# Patient Record
Sex: FEMALE | Race: WHITE | NOT HISPANIC OR LATINO | Employment: OTHER | URBAN - METROPOLITAN AREA
[De-identification: names, ages, dates, MRNs, and addresses within clinical notes are randomized per-mention and may not be internally consistent; named-entity substitution may affect disease eponyms.]

---

## 2017-03-10 ENCOUNTER — GENERIC CONVERSION - ENCOUNTER (OUTPATIENT)
Dept: OTHER | Facility: OTHER | Age: 82
End: 2017-03-10

## 2017-03-17 DIAGNOSIS — N18.30 CHRONIC KIDNEY DISEASE, STAGE III (MODERATE) (HCC): ICD-10-CM

## 2017-03-17 DIAGNOSIS — I12.9 HYPERTENSIVE CHRONIC KIDNEY DISEASE WITH STAGE 1 THROUGH STAGE 4 CHRONIC KIDNEY DISEASE, OR UNSPECIFIED CHRONIC KIDNEY DISEASE: ICD-10-CM

## 2017-03-17 DIAGNOSIS — D63.1 ANEMIA IN CHRONIC KIDNEY DISEASE (CODE): ICD-10-CM

## 2017-04-07 ENCOUNTER — ALLSCRIPTS OFFICE VISIT (OUTPATIENT)
Dept: OTHER | Facility: OTHER | Age: 82
End: 2017-04-07

## 2017-05-24 ENCOUNTER — ALLSCRIPTS OFFICE VISIT (OUTPATIENT)
Dept: OTHER | Facility: OTHER | Age: 82
End: 2017-05-24

## 2017-05-26 DIAGNOSIS — D69.6 THROMBOCYTOPENIA (HCC): ICD-10-CM

## 2017-05-26 DIAGNOSIS — I12.9 HYPERTENSIVE CHRONIC KIDNEY DISEASE WITH STAGE 1 THROUGH STAGE 4 CHRONIC KIDNEY DISEASE, OR UNSPECIFIED CHRONIC KIDNEY DISEASE: ICD-10-CM

## 2017-05-30 ENCOUNTER — GENERIC CONVERSION - ENCOUNTER (OUTPATIENT)
Dept: OTHER | Facility: OTHER | Age: 82
End: 2017-05-30

## 2017-08-01 DIAGNOSIS — E87.70 FLUID OVERLOAD: ICD-10-CM

## 2017-08-01 DIAGNOSIS — E78.00 PURE HYPERCHOLESTEROLEMIA: ICD-10-CM

## 2017-08-01 DIAGNOSIS — N18.30 CHRONIC KIDNEY DISEASE, STAGE III (MODERATE) (HCC): ICD-10-CM

## 2017-08-01 DIAGNOSIS — Z00.00 ENCOUNTER FOR GENERAL ADULT MEDICAL EXAMINATION WITHOUT ABNORMAL FINDINGS: ICD-10-CM

## 2017-08-01 DIAGNOSIS — D63.1 ANEMIA IN CHRONIC KIDNEY DISEASE (CODE): ICD-10-CM

## 2017-08-01 DIAGNOSIS — I12.9 HYPERTENSIVE CHRONIC KIDNEY DISEASE WITH STAGE 1 THROUGH STAGE 4 CHRONIC KIDNEY DISEASE, OR UNSPECIFIED CHRONIC KIDNEY DISEASE: ICD-10-CM

## 2017-08-01 DIAGNOSIS — I10 ESSENTIAL (PRIMARY) HYPERTENSION: ICD-10-CM

## 2017-08-01 DIAGNOSIS — N17.9 ACUTE KIDNEY FAILURE (HCC): ICD-10-CM

## 2017-09-05 ENCOUNTER — GENERIC CONVERSION - ENCOUNTER (OUTPATIENT)
Dept: OTHER | Facility: OTHER | Age: 82
End: 2017-09-05

## 2017-09-05 ENCOUNTER — GENERIC CONVERSION - ENCOUNTER (OUTPATIENT)
Dept: NEPHROLOGY | Facility: CLINIC | Age: 82
End: 2017-09-05

## 2017-09-05 DIAGNOSIS — E78.00 PURE HYPERCHOLESTEROLEMIA: ICD-10-CM

## 2017-09-05 DIAGNOSIS — D63.1 ANEMIA IN CHRONIC KIDNEY DISEASE (CODE): ICD-10-CM

## 2017-09-05 DIAGNOSIS — N18.30 CHRONIC KIDNEY DISEASE, STAGE III (MODERATE) (HCC): ICD-10-CM

## 2017-09-05 DIAGNOSIS — I12.9 HYPERTENSIVE CHRONIC KIDNEY DISEASE WITH STAGE 1 THROUGH STAGE 4 CHRONIC KIDNEY DISEASE, OR UNSPECIFIED CHRONIC KIDNEY DISEASE: ICD-10-CM

## 2017-09-05 DIAGNOSIS — E83.52 HYPERCALCEMIA: ICD-10-CM

## 2017-09-05 DIAGNOSIS — E87.70 FLUID OVERLOAD: ICD-10-CM

## 2017-12-01 DIAGNOSIS — N18.30 CHRONIC KIDNEY DISEASE, STAGE III (MODERATE) (HCC): ICD-10-CM

## 2017-12-01 DIAGNOSIS — E83.52 HYPERCALCEMIA: ICD-10-CM

## 2017-12-01 DIAGNOSIS — E78.00 PURE HYPERCHOLESTEROLEMIA: ICD-10-CM

## 2017-12-01 DIAGNOSIS — E87.70 FLUID OVERLOAD: ICD-10-CM

## 2017-12-01 DIAGNOSIS — D63.1 ANEMIA IN CHRONIC KIDNEY DISEASE (CODE): ICD-10-CM

## 2017-12-01 DIAGNOSIS — I12.9 HYPERTENSIVE CHRONIC KIDNEY DISEASE WITH STAGE 1 THROUGH STAGE 4 CHRONIC KIDNEY DISEASE, OR UNSPECIFIED CHRONIC KIDNEY DISEASE: ICD-10-CM

## 2017-12-08 ENCOUNTER — GENERIC CONVERSION - ENCOUNTER (OUTPATIENT)
Dept: OTHER | Facility: OTHER | Age: 82
End: 2017-12-08

## 2018-01-11 NOTE — MISCELLANEOUS
Message   Recorded as Task   Date: 12/22/2016 08:39 AM, Created By: Marko Andrade   Task Name: Follow Up   Assigned To: Maykel Ricks   Regarding Patient: William Sinclair, Status: Active   Comment:    Marko Andrade - 22 Dec 2016 8:39 AM     TASK CREATED  IRON ONE A DAY AS TOLERATED RE CONSTIPATION   i spoke to pt and informed about the above  PL      Active Problems    1  Acute kidney injury (584 9) (N17 9)   2  Anemia due to stage 3 chronic kidney disease (285 21,585 3) (D63 1,N18 3)   3  Benign essential hypertension (401 1) (I10)   4  Benign hypertension with chronic kidney disease, stage III (403 10,585 3) (I12 9,N18 3)   5  Chronic kidney disease (CKD), stage 3 (moderate) (585 3) (N18 3)   6  Fluid overload (276 69) (E87 70)   7  Hypercholesterolemia (272 0) (E78 00)    Current Meds   1  Allopurinol 100 MG Oral Tablet; TAKE 1 TABLET DAILY; Therapy: 63Pev8544 to (Evaluate:70Gkj5094) Recorded   2  Allopurinol 300 MG Oral Tablet; TAKE 1 TABLET DAILY; Therapy: 29Ftg5152 to (Evaluate:00Cmn0285) Recorded   3  Aspirin 325 MG Oral Tablet; TAKE 1 TABLET DAILY; Therapy: 62Plp0248 to Recorded   4  Digoxin 125 MCG Oral Tablet; TAKE 1 TABLET DAILY; Therapy: 52Htl3327 to Recorded   5  Effient 10 MG Oral Tablet; TAKE 1 TABLET DAILY; Therapy: 72Qba1301 to Recorded   6  Enalapril Maleate 2 5 MG Oral Tablet; TAKE 1 TABLET TWICE DAILY; Therapy: 71Yxy6068 to Recorded   7  Furosemide 40 MG Oral Tablet; TAKE 1 TABLET DAILY; Therapy: 57IQK7755 to (Evaluate:98Ipb4398); Last Rx:63Izc0120 Ordered   8  Isosorbide Mononitrate ER 60 MG Oral Tablet Extended Release 24 Hour; TAKE 1   TABLET ONCE DAILY; Therapy: 90Frx4856 to Recorded   9  Metoprolol Succinate  MG Oral Tablet Extended Release 24 Hour; TAKE 3   TABLET ONCE DAILY; Therapy: 38Ehy1802 to Recorded   10  Simvastatin 20 MG Oral Tablet; take 1 tablet by mouth daily; Therapy: 16Mdj9014 to (Evaluate:09Ift5027) Recorded    Allergies    1   No Known Drug Allergies    Signatures   Electronically signed by : NAEL Love ; Jan 2 2017  1:16PM EST

## 2018-01-13 VITALS
SYSTOLIC BLOOD PRESSURE: 152 MMHG | WEIGHT: 166 LBS | BODY MASS INDEX: 26.68 KG/M2 | HEIGHT: 66 IN | HEART RATE: 56 BPM | DIASTOLIC BLOOD PRESSURE: 56 MMHG

## 2018-01-13 NOTE — MISCELLANEOUS
Message   Recorded as Task   Date: 08/15/2016 12:39 PM, Created By: Gloria Rios   Task Name: Follow Up   Assigned To: Apple Rodríguez   Regarding Patient: Marylou Yu, Status: Active   CommentKenney Mac - 15 Aug 2016 12:39 PM     TASK CREATED  1  Place patient on vitamin D 2000 units daily over-the-counter  2  Follow-up basic metabolic profile in one to 2 weeks  3  Low potassium dietÃ¢??2 g   Spoke to pt and informed about the above  PL      Active Problems    1  Acute kidney injury (584 9) (N17 9)   2  Benign essential hypertension (401 1) (I10)   3  Benign hypertension with chronic kidney disease, stage III (403 10,585 3) (I12 9,N18 3)   4  Chronic kidney disease (CKD), stage 3 (moderate) (585 3) (N18 3)   5  Fluid overload (276 69) (E87 70)   6  Hypercholesterolemia (272 0) (E78 0)    Current Meds   1  Allopurinol 100 MG Oral Tablet; TAKE 1 TABLET DAILY; Therapy: 91Kvi0596 to (Evaluate:48Zvr5471) Recorded   2  Allopurinol 300 MG Oral Tablet; TAKE 1 TABLET DAILY; Therapy: 94Yeo0846 to (Evaluate:47Gaz2465) Recorded   3  Aspirin 325 MG Oral Tablet; TAKE 1 TABLET DAILY; Therapy: 12Aug2016 to Recorded   4  Digoxin 125 MCG Oral Tablet; TAKE 1 TABLET DAILY; Therapy: 12Aug2016 to Recorded   5  Effient 10 MG Oral Tablet; TAKE 1 TABLET DAILY; Therapy: 59Ohe7171 to Recorded   6  Enalapril Maleate 2 5 MG Oral Tablet; TAKE 1 TABLET TWICE DAILY; Therapy: 12Aug2016 to Recorded   7  Furosemide 20 MG Oral Tablet; TAKE 2 TABLETS (40MG) ALTERNATING WITH 1   TABLET (20MG) DAILY; Therapy: 12Aug2016 to Recorded   8  GlyBURIDE-MetFORMIN 5-500 MG Oral Tablet; TAKE 1 TABLET TWICE DAILY; Therapy: 12Aug2016 to Recorded   9  Isosorbide Mononitrate ER 60 MG Oral Tablet Extended Release 24 Hour; TAKE 1   TABLET ONCE DAILY; Therapy: 12Aug2016 to Recorded   10  MetFORMIN HCl - 500 MG Oral Tablet; TAKE 1 TABLET DAILY; Therapy: 12Jmr7648 to Recorded   11   Metoprolol Succinate  MG Oral Tablet Extended Release 24 Hour; TAKE 3    TABLET ONCE DAILY; Therapy: 95Rwn8076 to Recorded   12  Simvastatin 20 MG Oral Tablet; take 1 tablet by mouth daily; Therapy: 56Bpg9635 to (Evaluate:10Rfi4441) Recorded    Allergies    1   No Known Drug Allergies    Signatures   Electronically signed by : NAEL Walsh ; Aug 16 2016  2:18PM EST

## 2018-01-14 NOTE — MISCELLANEOUS
Message   Recorded as Task   Date: 05/30/2017 03:24 PM, Created By: Truitt Kussmaul   Task Name: Follow Up   Assigned To: [facility],Team   Regarding Patient: Scarlett Park, Status: Active   CommentJama Grant - 30 May 2017 3:24 PM     TASK CREATED  Repeat CBC with platelets in 2-3 weeks, diagnosis mild thrombocytopenia   instructions given to pt/lr      Active Problems    1  Acute kidney injury (584 9) (N17 9)   2  Anemia due to stage 3 chronic kidney disease (285 21,585 3) (D63 1,N18 3)   3  Benign essential hypertension (401 1) (I10)   4  Benign hypertension with chronic kidney disease, stage III (403 10,585 3) (I12 9,N18 3)   5  Chronic kidney disease (CKD), stage 3 (moderate) (585 3) (N18 3)   6  Coronary artery disease (414 00) (I25 10)   7  Fluid overload (276 69) (E87 70)   8  Gout (274 9) (M10 9)   9  Hypercalcemia (275 42) (E83 52)   10  Hypercholesterolemia (272 0) (E78 00)    Current Meds   1  Allopurinol 100 MG Oral Tablet; TAKE 1 TABLET DAILY; Therapy: 34Tvr8890 to (Evaluate:63Ktb5353) Recorded   2  Allopurinol 300 MG Oral Tablet; TAKE 1 TABLET DAILY; Therapy: 18Uyh6411 to (Evaluate:93Kpq5105) Recorded   3  AmLODIPine Besylate 2 5 MG Oral Tablet; TAKE 1 TABLET DAILY; Therapy: 36Wcm4067 to (23 215125)  Requested for: 07Apr2017; Last   Rx:07Apr2017 Ordered   4  Aspirin Low Dose TABS; TAKE 1 TABLET DAILY; Therapy: 88Wab3469 to Recorded   5  Colace 100 MG Oral Capsule; take 1 capsule daily; Therapy: 37BNA8027 to Recorded   6  Effient 10 MG Oral Tablet; TAKE 1 TABLET DAILY; Therapy: 75Ysk3020 to Recorded   7  Eye Vitamins Oral Capsule; take 1 capsule daily; Therapy: 23ZPZ6334 to Recorded   8  Furosemide 40 MG Oral Tablet; TAKE 1 TABLET DAILY; Therapy: 17VRF3695 to (Evaluate:58Yqy8608); Last Rx:05Dhz2835 Ordered   9  Januvia 50 MG Oral Tablet; TAKE 1 TABLET DAILY; Therapy: 04YLN0211 to Recorded   10   Metoprolol Succinate  MG Oral Tablet Extended Release 24 Hour; TAKE 1    TABLET TWICE DAILY; Therapy: 75Cgi5892 to (Evaluate:05Mar2018) Recorded    Allergies    1  Lisinopril TABS   2   ACE Inhibitors    Signatures   Electronically signed by : Ori Galvan, ; May 30 2017  4:21PM EST                       (Author)

## 2018-01-15 VITALS
WEIGHT: 171.5 LBS | BODY MASS INDEX: 27.56 KG/M2 | SYSTOLIC BLOOD PRESSURE: 154 MMHG | DIASTOLIC BLOOD PRESSURE: 74 MMHG | HEIGHT: 66 IN | HEART RATE: 64 BPM

## 2018-01-18 NOTE — MISCELLANEOUS
Message   Recorded as Task   Date: 03/09/2017 04:58 PM, Created By: Maricruz Torres   Task Name: Follow Up   Assigned To: Daryl Severance   Regarding Patient: Sheryl Carrera, Status: Active   CommentMonica Cao - 09 Mar 2017 4:58 PM     TASK CREATED  Please make sure the patient is feeling well  No new medications  Review all medications with her  Repeat a basic metabolic profile next week   Recorded as Task   Date: 03/09/2017 04:59 PM, Created By: Maricruz Torres   Task Name: Follow Up   Assigned To: Daryl Severance   Regarding Patient: Sheryl Carrera, Status: Active   CommentMonica Cao - 09 Mar 2017 4:59 PM     TASK CREATED  Also repeat a CBC with platelets because of low platelet count   I spoke to patient  She states she is feeling well  I reviewed the medications  Lab slip mailed to patient for repeat blood work  PL      Active Problems    1  Acute kidney injury (584 9) (N17 9)   2  Anemia due to stage 3 chronic kidney disease (285 21,585 3) (D63 1,N18 3)   3  Benign essential hypertension (401 1) (I10)   4  Benign hypertension with chronic kidney disease, stage III (403 10,585 3) (I12 9,N18 3)   5  Chronic kidney disease (CKD), stage 3 (moderate) (585 3) (N18 3)   6  Fluid overload (276 69) (E87 70)   7  Hypercholesterolemia (272 0) (E78 00)    Current Meds   1  Allopurinol 100 MG Oral Tablet; TAKE 1 TABLET DAILY; Therapy: 12Aug2016 to (Evaluate:90Lta9023) Recorded   2  Allopurinol 300 MG Oral Tablet; TAKE 1 TABLET DAILY; Therapy: 12Aug2016 to (Evaluate:08Feb2017) Recorded   3  Aspirin 325 MG Oral Tablet; TAKE 1 TABLET DAILY; Therapy: 12Aug2016 to Recorded   4  Digoxin 125 MCG Oral Tablet; TAKE 1 TABLET DAILY; Therapy: 12Aug2016 to Recorded   5  Effient 10 MG Oral Tablet; TAKE 1 TABLET DAILY; Therapy: 12Aug2016 to Recorded   6  Enalapril Maleate 2 5 MG Oral Tablet; TAKE 1 TABLET TWICE DAILY; Therapy: 12Aug2016 to Recorded   7   Furosemide 40 MG Oral Tablet; TAKE 1 TABLET DAILY; Therapy: 22IBP3230 to (Evaluate:73Gxc1231); Last Rx:13Mon9541 Ordered   8  Iron 325 (65 Fe) MG Oral Tablet; TAKE 1 CAPSULE Daily; Therapy: 41Zzi5509 to (Evaluate:37Mvl2586) Recorded   9  Isosorbide Mononitrate ER 60 MG Oral Tablet Extended Release 24 Hour; TAKE 1   TABLET ONCE DAILY; Therapy: 50Jpr4354 to Recorded   10  Metoprolol Succinate  MG Oral Tablet Extended Release 24 Hour; TAKE 1    TABLET TWICE DAILY; Therapy: 26Guf6804 to (Evaluate:05Mar2018) Recorded   11  Simvastatin 20 MG Oral Tablet; take 1 tablet by mouth daily; Therapy: 13Vnz0466 to (Evaluate:10Nov2016) Recorded    Allergies    1   No Known Drug Allergies    Signatures   Electronically signed by : NAEL Kaur ; Mar 14 2017  2:05PM EST

## 2018-01-22 VITALS — WEIGHT: 182 LBS | HEIGHT: 66 IN | BODY MASS INDEX: 29.25 KG/M2

## 2018-01-22 VITALS — SYSTOLIC BLOOD PRESSURE: 180 MMHG | HEART RATE: 72 BPM | DIASTOLIC BLOOD PRESSURE: 78 MMHG

## 2018-01-25 RX ORDER — TORSEMIDE 20 MG/1
TABLET ORAL
Qty: 30 TABLET | Refills: 2 | OUTPATIENT
Start: 2018-01-25

## 2018-01-26 DIAGNOSIS — I12.9 NEPHROSCLEROSIS: Primary | ICD-10-CM

## 2018-01-29 RX ORDER — TORSEMIDE 20 MG/1
20 TABLET ORAL DAILY
Qty: 30 TABLET | Refills: 5 | Status: SHIPPED | OUTPATIENT
Start: 2018-01-29 | End: 2018-04-06 | Stop reason: SDUPTHER

## 2018-01-29 RX ORDER — TORSEMIDE 20 MG/1
TABLET ORAL
Qty: 30 TABLET | Refills: 2 | Status: SHIPPED | OUTPATIENT
Start: 2018-01-29 | End: 2018-01-30 | Stop reason: SDUPTHER

## 2018-01-30 ENCOUNTER — OFFICE VISIT (OUTPATIENT)
Dept: NEPHROLOGY | Facility: CLINIC | Age: 83
End: 2018-01-30
Payer: MEDICARE

## 2018-01-30 VITALS
DIASTOLIC BLOOD PRESSURE: 90 MMHG | SYSTOLIC BLOOD PRESSURE: 160 MMHG | HEIGHT: 65 IN | WEIGHT: 190 LBS | BODY MASS INDEX: 31.65 KG/M2 | HEART RATE: 72 BPM

## 2018-01-30 DIAGNOSIS — N18.30 CHRONIC KIDNEY DISEASE, STAGE III (MODERATE) (HCC): ICD-10-CM

## 2018-01-30 DIAGNOSIS — I12.9 PARENCHYMAL RENAL HYPERTENSION, STAGE 1 THROUGH STAGE 4 OR UNSPECIFIED CHRONIC KIDNEY DISEASE: Primary | ICD-10-CM

## 2018-01-30 DIAGNOSIS — E56.9 VITAMIN DEFICIENCY: ICD-10-CM

## 2018-01-30 DIAGNOSIS — D63.1 ANEMIA OF CHRONIC RENAL FAILURE, STAGE 3 (MODERATE) (HCC): ICD-10-CM

## 2018-01-30 DIAGNOSIS — E78.5 DYSLIPIDEMIA: ICD-10-CM

## 2018-01-30 DIAGNOSIS — E83.52 HYPERCALCEMIA: ICD-10-CM

## 2018-01-30 DIAGNOSIS — N18.30 ANEMIA OF CHRONIC RENAL FAILURE, STAGE 3 (MODERATE) (HCC): ICD-10-CM

## 2018-01-30 DIAGNOSIS — E87.70 HYPERVOLEMIA, UNSPECIFIED HYPERVOLEMIA TYPE: ICD-10-CM

## 2018-01-30 PROCEDURE — 99214 OFFICE O/P EST MOD 30 MIN: CPT | Performed by: INTERNAL MEDICINE

## 2018-01-30 RX ORDER — CLOPIDOGREL BISULFATE 75 MG/1
75 TABLET ORAL DAILY
COMMUNITY
Start: 2018-01-03

## 2018-01-30 RX ORDER — LANOLIN ALCOHOL/MO/W.PET/CERES
1 CREAM (GRAM) TOPICAL DAILY
COMMUNITY
Start: 2017-09-05

## 2018-01-30 RX ORDER — ALLOPURINOL 300 MG/1
1 TABLET ORAL DAILY
COMMUNITY
Start: 2016-08-10

## 2018-01-30 RX ORDER — METOPROLOL SUCCINATE 100 MG/1
1 TABLET, EXTENDED RELEASE ORAL 2 TIMES DAILY
COMMUNITY
Start: 2016-08-12

## 2018-01-30 RX ORDER — DOCUSATE SODIUM 100 MG/1
1 CAPSULE, LIQUID FILLED ORAL DAILY
COMMUNITY
Start: 2017-05-24

## 2018-01-30 RX ORDER — ALLOPURINOL 100 MG/1
200 TABLET ORAL DAILY
COMMUNITY
Start: 2016-10-14

## 2018-01-30 RX ORDER — AMLODIPINE BESYLATE 2.5 MG/1
5 TABLET ORAL DAILY
Refills: 3 | COMMUNITY
Start: 2017-12-02 | End: 2018-02-14 | Stop reason: SDUPTHER

## 2018-01-30 NOTE — PATIENT INSTRUCTIONS
1   Please increase amlodipine to 2-2 5 mg tablets equivalent to 5 mg once a day in the morning  When you run out of the 2 5 mg dose please call me and we will refill 5 mg tablets  Watch for swelling of your legs that worsens  2   Please wait 2 weeks and then take 1 week a blood pressure readings morning and evening  Take your morning readings before taking any medications in the morning  3   Please then bring in those blood pressure readings and your blood pressure machine to see 1 of my advanced practitioner's in about 3-4 weeks in the office  4   Follow-up in 4 months  Please go for lab work fasting prior to that appointment  We also bring in 1 week a blood pressure readings morning and evening at that appointment in 4 months  5   General recommendations:  -avoid salt  -try to stay active such as walking for 20-30 minutes a few days during the week  -do not take any medicines such as Motrin, Naprosyn, Aleve or Advil or ibuprofen on a regular basis, you can take Tylenol as needed   -avoid Sudafed or any decongestants as they can raise blood pressure as well   -please call if your leg swelling gets any worse  Chronic Hypertension   WHAT YOU NEED TO KNOW:   Hypertension is high blood pressure (BP)  Your BP is the force of your blood moving against the walls of your arteries  Normal BP is less than 120/80  Prehypertension is between 120/80 and 139/89  Hypertension is 140/90 or higher  Hypertension causes your BP to get so high that your heart has to work much harder than normal  This can damage your heart  Chronic hypertension is a long-term condition that you can control with a healthy lifestyle or medicines  A controlled blood pressure helps protect your organs, such as your heart, lungs, brain, and kidneys  DISCHARGE INSTRUCTIONS:   Call 911 for any of the following:   · You have discomfort in your chest that feels like squeezing, pressure, fullness, or pain       · You become confused or have difficulty speaking  · You suddenly feel lightheaded or have trouble breathing  · You have pain or discomfort in your back, neck, jaw, stomach, or arm  Seek care immediately if:   · You have a severe headache or vision loss  · You have weakness in an arm or leg  Contact your healthcare provider if:   · You feel faint, dizzy, confused, or drowsy  · You have been taking your BP medicine and your BP is still higher than your healthcare provider says it should be  · You have questions or concerns about your condition or care  Medicines: You may need any of the following:  · Medicine  may be used to help lower your BP  You may need more than one type of medicine  Take the medicine exactly as directed  · Diuretics  help decrease extra fluid that collects in your body  This will help lower your BP  You may urinate more often while you take this medicine  · Cholesterol medicine  helps lower your cholesterol level  A low cholesterol level helps prevent heart disease and makes it easier to control your blood pressure  · Take your medicine as directed  Contact your healthcare provider if you think your medicine is not helping or if you have side effects  Tell him or her if you are allergic to any medicine  Keep a list of the medicines, vitamins, and herbs you take  Include the amounts, and when and why you take them  Bring the list or the pill bottles to follow-up visits  Carry your medicine list with you in case of an emergency  Follow up with your healthcare provider as directed: You will need to return to have your blood pressure checked and to have other lab tests done  Write down your questions so you remember to ask them during your visits  Manage chronic hypertension:  Talk with your healthcare provider about these and other ways to manage hypertension:  · Take your BP at home  Sit and rest for 5 minutes before you take your BP  Extend your arm and support it on a flat surface   Your arm should be at the same level as your heart  Follow the directions that came with your BP monitor  If possible, take at least 2 BP readings each time  Take your BP at least twice a day at the same times each day, such as morning and evening  Keep a record of your BP readings and bring it to your follow-up visits  Ask your healthcare provider what your blood pressure should be  · Limit sodium (salt) as directed  Too much sodium can affect your fluid balance  Check labels to find low-sodium or no-salt-added foods  Some low-sodium foods use potassium salts for flavor  Too much potassium can also cause health problems  Your healthcare provider will tell you how much sodium and potassium are safe for you to have in a day  He or she may recommend that you limit sodium to 2,300 mg a day  · Follow the meal plan recommended by your healthcare provider  A dietitian or your provider can give you more information on low-sodium plans or the DASH (Dietary Approaches to Stop Hypertension) eating plan  The DASH plan is low in sodium, unhealthy fats, and total fat  It is high in potassium, calcium, and fiber  · Exercise to maintain a healthy weight  Exercise at least 30 minutes per day, on most days of the week  This will help decrease your blood pressure  Ask about the best exercise plan for you  · Decrease stress  This may help lower your BP  Learn ways to relax, such as deep breathing or listening to music  · Limit alcohol  Women should limit alcohol to 1 drink a day  Men should limit alcohol to 2 drinks a day  A drink of alcohol is 12 ounces of beer, 5 ounces of wine, or 1½ ounces of liquor  · Do not smoke  Nicotine and other chemicals in cigarettes and cigars can increase your BP and also cause lung damage  Ask your healthcare provider for information if you currently smoke and need help to quit  E-cigarettes or smokeless tobacco still contain nicotine   Talk to your healthcare provider before you use these products  © 2017 [employer] Information is for End User's use only and may not be sold, redistributed or otherwise used for commercial purposes  All illustrations and images included in CareNotes® are the copyrighted property of [employer]  or [employer]el[employer]  The above information is an  only  It is not intended as medical advice for individual conditions or treatments  Talk to your doctor, nurse or pharmacist before following any medical regimen to see if it is safe and effective for you

## 2018-01-30 NOTE — PROGRESS NOTES
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  The mainstay of therapy remains good hypertensive control/good dyslipidemic control per your discretion/good diabetic control per your discretion off metformin  #2  Volume: She remains essentially euvolemic with nonpitting edema which is stable  #3  Hypertension: Slightly elevated systolic readings but some orthostatic changes  I would recommend switching from furosemide to torsemide 20 mg a day since it is a better and longer acting antihypertensive agent/diuretic  I would avoid overtreating her given her orthostasis  She will send in readings in a few weeks  Consideration for increasing amlodipine to 2 5 mg twice a day  I would hold off on an ACE inhibitor/angiotensin receptor 2 blocker given older age and insignificant proteinuria though it is an option going forward in the future  I'm always concerned about the possibility of AK I given her frail condition and age  #4  Electrolytes: Remains normal   #5  Mineral bone disorder:  Hypercalcemia has resolved with a level of 9 4  Workup is as follows:  -PTH intact level 23 4  -ACE level 65  -plasma free light chains normal  -urine protein electrophoresis normal  Further workup only if hypercalcemia recurs  #6  Anemia:  Hemoglobin main is quite good at 12 4  Currently on iron  #7  CAD status post aortic valve replacement doing well and follows with cardiology  #8  Diabetes mellitus per your discretion  Patient Instructions     1  Please increase amlodipine to 2-2 5 mg tablets equivalent to 5 mg once a day in the morning  When you run out of the 2 5 mg dose please call me and we will refill 5 mg tablets    Watch for swelling of your legs that worsens  2   Please wait 2 weeks and then take 1 week a blood pressure readings morning and evening  Take your morning readings before taking any medications in the morning  3   Please then bring in those blood pressure readings and your blood pressure machine to see 1 of my advanced practitioner's in about 3-4 weeks in the office  4   Follow-up in 4 months  Please go for lab work fasting prior to that appointment  We also bring in 1 week a blood pressure readings morning and evening at that appointment in 4 months  5   General recommendations:  -avoid salt  -try to stay active such as walking for 20-30 minutes a few days during the week  -do not take any medicines such as Motrin, Naprosyn, Aleve or Advil or ibuprofen on a regular basis, you can take Tylenol as needed   -avoid Sudafed or any decongestants as they can raise blood pressure as well   -please call if your leg swelling gets any worse  Chronic Hypertension   WHAT YOU NEED TO KNOW:   Hypertension is high blood pressure (BP)  Your BP is the force of your blood moving against the walls of your arteries  Normal BP is less than 120/80  Prehypertension is between 120/80 and 139/89  Hypertension is 140/90 or higher  Hypertension causes your BP to get so high that your heart has to work much harder than normal  This can damage your heart  Chronic hypertension is a long-term condition that you can control with a healthy lifestyle or medicines  A controlled blood pressure helps protect your organs, such as your heart, lungs, brain, and kidneys  DISCHARGE INSTRUCTIONS:   Call 911 for any of the following:   · You have discomfort in your chest that feels like squeezing, pressure, fullness, or pain  · You become confused or have difficulty speaking  · You suddenly feel lightheaded or have trouble breathing  · You have pain or discomfort in your back, neck, jaw, stomach, or arm    Seek care immediately if:   · You have a severe headache or vision loss     · You have weakness in an arm or leg  Contact your healthcare provider if:   · You feel faint, dizzy, confused, or drowsy  · You have been taking your BP medicine and your BP is still higher than your healthcare provider says it should be  · You have questions or concerns about your condition or care  Medicines: You may need any of the following:  · Medicine  may be used to help lower your BP  You may need more than one type of medicine  Take the medicine exactly as directed  · Diuretics  help decrease extra fluid that collects in your body  This will help lower your BP  You may urinate more often while you take this medicine  · Cholesterol medicine  helps lower your cholesterol level  A low cholesterol level helps prevent heart disease and makes it easier to control your blood pressure  · Take your medicine as directed  Contact your healthcare provider if you think your medicine is not helping or if you have side effects  Tell him or her if you are allergic to any medicine  Keep a list of the medicines, vitamins, and herbs you take  Include the amounts, and when and why you take them  Bring the list or the pill bottles to follow-up visits  Carry your medicine list with you in case of an emergency  Follow up with your healthcare provider as directed: You will need to return to have your blood pressure checked and to have other lab tests done  Write down your questions so you remember to ask them during your visits  Manage chronic hypertension:  Talk with your healthcare provider about these and other ways to manage hypertension:  · Take your BP at home  Sit and rest for 5 minutes before you take your BP  Extend your arm and support it on a flat surface  Your arm should be at the same level as your heart  Follow the directions that came with your BP monitor  If possible, take at least 2 BP readings each time   Take your BP at least twice a day at the same times each day, such as morning and evening  Keep a record of your BP readings and bring it to your follow-up visits  Ask your healthcare provider what your blood pressure should be  · Limit sodium (salt) as directed  Too much sodium can affect your fluid balance  Check labels to find low-sodium or no-salt-added foods  Some low-sodium foods use potassium salts for flavor  Too much potassium can also cause health problems  Your healthcare provider will tell you how much sodium and potassium are safe for you to have in a day  He or she may recommend that you limit sodium to 2,300 mg a day  · Follow the meal plan recommended by your healthcare provider  A dietitian or your provider can give you more information on low-sodium plans or the DASH (Dietary Approaches to Stop Hypertension) eating plan  The DASH plan is low in sodium, unhealthy fats, and total fat  It is high in potassium, calcium, and fiber  · Exercise to maintain a healthy weight  Exercise at least 30 minutes per day, on most days of the week  This will help decrease your blood pressure  Ask about the best exercise plan for you  · Decrease stress  This may help lower your BP  Learn ways to relax, such as deep breathing or listening to music  · Limit alcohol  Women should limit alcohol to 1 drink a day  Men should limit alcohol to 2 drinks a day  A drink of alcohol is 12 ounces of beer, 5 ounces of wine, or 1½ ounces of liquor  · Do not smoke  Nicotine and other chemicals in cigarettes and cigars can increase your BP and also cause lung damage  Ask your healthcare provider for information if you currently smoke and need help to quit  E-cigarettes or smokeless tobacco still contain nicotine  Talk to your healthcare provider before you use these products  © 2017 [employer] Information is for End User's use only and may not be sold, redistributed or otherwise used for commercial purposes   All illustrations and images included in [employer] are the copyrighted property of [employer] Inc  or [employer]el[employer]  The above information is an  only  It is not intended as medical advice for individual conditions or treatments  Talk to your doctor, nurse or pharmacist before following any medical regimen to see if it is safe and effective for you  Subjective:   Overall the patient is doing well  Good appetite and energy  No fevers or chills  No cough or colds  No urinary symptoms including foamy urine  No GI symptoms including diarrhea  No chest pain, shortness of breath and no significant leg swelling  No headaches, dizziness or lightheadedness  Blood pressure medications:  -amlodipine 2 5 mg daily blood pressures at home:  -torsemide 20 mg daily  -metoprolol succinate/Toprol-XL:  100 mg 2 times a day  Blood pressure at home:  -a m :  152/79 with standing 139/83 heart rate in the 70  ROS:  See HPI, otherwise review of systems as completely reviewed with the patient are negative    I COMPLETELY REVIEWED THE PAST MEDICAL HISTORY/PAST SURGICAL HISTORY/SOCIAL HISTORY/FAMILY HISTORY/AND MEDICATIONS  AND UPDATED ALL    Objective:     Vitals:   Vitals:    01/30/18 1517   BP: 160/90   Pulse: 72     Blood pressure today:  -sitting: On left arm:  176/80 with a heart rate of 84 and regular  -standing: On left arm:  172/80 with a heart rate of 84 and regular    Weight (last 2 days)     Date/Time   Weight    01/30/18 1517  86 2 (190)    01/30/18 1516  86 2 (190)            Body mass index is 31 62 kg/m²      Physical Exam: General:  No acute distress  Skin:  No acute rash  Eyes:  No scleral icterus  ENT:  Moist mucous membranes, normocephalic/atraumatic  Neck:  Supple, no jugular venous distention  Chest:  Clear to auscultation and percussion  CVS:  Regular rate and rhythm without a murmur rub or gallops appreciated  Abdomen:  Obese, soft and nontender with normal bowel sounds  Extremities:  No cyanosis, no clubbing, 1+ lower extremity edema is bilaterally 1/2 the way up the pretibial region  Neuro:  Grossly intact  Psych:  Alert and oriented      Medications:    Current Outpatient Prescriptions:     allopurinol (ZYLOPRIM) 100 mg tablet, Take 100 mg by mouth daily, Disp: , Rfl:     allopurinol (ZYLOPRIM) 300 mg tablet, Take 1 tablet by mouth daily, Disp: , Rfl:     aspirin (ASPIRIN LOW DOSE) 81 MG tablet, Take 1 tablet by mouth daily, Disp: , Rfl:     clopidogrel (PLAVIX) 75 mg tablet, Take 75 mg by mouth daily, Disp: , Rfl:     docusate sodium (COLACE) 100 mg capsule, Take 1 capsule by mouth daily, Disp: , Rfl:     ferrous sulfate 325 (65 FE) MG EC tablet, Take 1 capsule by mouth daily, Disp: , Rfl:     metoprolol succinate (TOPROL-XL) 100 mg 24 hr tablet, Take 1 tablet by mouth 2 (two) times a day, Disp: , Rfl:     sitaGLIPtin (JANUVIA) 50 mg tablet, Take 1 tablet by mouth daily, Disp: , Rfl:     amLODIPine (NORVASC) 2 5 mg tablet, Take 2 5 mg by mouth daily, Disp: , Rfl: 3    cholecalciferol (VITAMIN D3) 1,000 units tablet, Take 1,000 Units by mouth, Disp: , Rfl:     torsemide (DEMADEX) 20 mg tablet, Take 1 tablet (20 mg total) by mouth daily, Disp: 30 tablet, Rfl: 5    Lab, Imaging and other studies: I have personally reviewed pertinent labs    Laboratory Results:  Results for orders placed or performed in visit on 08/12/16   POCT urinalysis dipstick (Historical)   Result Value Ref Range    Color, UA Yellow     Clarity, UA Transparent     Leukocytes, UA large     Nitrite, UA negative     Blood, UA trace     Bilirubin, UA negative     Urobilinogen, UA 0 2     Protein, UA negative     pH, UA 5 0     Specific [place], UA 1 010     Ketones, UA negative     Glucose negative     Lab results were performed at [facility] and I reviewed them as of labs 01/23/2018:  Pertinent positives:  -hemoglobin 12 4 otherwise normal CBC  -chemistry notable for creatinine 1 41 which is at baseline for her otherwise chemistry normal including electrolytes  -UPC:  0 16  -mineral bone disorder:  Calcium 9 4/magnesium 1 8/phosphorus 2 7/PTH intact level 23 4    Radiology review:   chest X-ray    Ultrasound      Portions of the record may have been created with voice recognition software   Occasional wrong word or "sound a like" substitutions may have occurred due to the inherent limitations of voice recognition software   Read the chart carefully and recognize, using context, where substitutions have occurred

## 2018-02-14 DIAGNOSIS — I10 ESSENTIAL HYPERTENSION: Primary | ICD-10-CM

## 2018-02-14 DIAGNOSIS — I12.9 PARENCHYMAL RENAL HYPERTENSION, STAGE 1 THROUGH STAGE 4 OR UNSPECIFIED CHRONIC KIDNEY DISEASE: Primary | ICD-10-CM

## 2018-02-14 RX ORDER — AMLODIPINE BESYLATE 5 MG/1
5 TABLET ORAL DAILY
Qty: 90 TABLET | Refills: 3 | Status: SHIPPED | OUTPATIENT
Start: 2018-02-14 | End: 2018-02-28 | Stop reason: SDUPTHER

## 2018-02-14 RX ORDER — AMLODIPINE BESYLATE 5 MG/1
5 TABLET ORAL DAILY
Qty: 90 TABLET | Refills: 3 | OUTPATIENT
Start: 2018-02-14

## 2018-02-28 ENCOUNTER — OFFICE VISIT (OUTPATIENT)
Dept: NEPHROLOGY | Facility: CLINIC | Age: 83
End: 2018-02-28
Payer: MEDICARE

## 2018-02-28 VITALS
DIASTOLIC BLOOD PRESSURE: 72 MMHG | HEIGHT: 65 IN | WEIGHT: 192 LBS | BODY MASS INDEX: 31.99 KG/M2 | HEART RATE: 80 BPM | SYSTOLIC BLOOD PRESSURE: 164 MMHG

## 2018-02-28 DIAGNOSIS — N18.30 CHRONIC KIDNEY DISEASE, STAGE III (MODERATE) (HCC): Primary | ICD-10-CM

## 2018-02-28 DIAGNOSIS — I12.9 PARENCHYMAL RENAL HYPERTENSION, STAGE 1 THROUGH STAGE 4 OR UNSPECIFIED CHRONIC KIDNEY DISEASE: ICD-10-CM

## 2018-02-28 PROCEDURE — 99213 OFFICE O/P EST LOW 20 MIN: CPT | Performed by: PHYSICIAN ASSISTANT

## 2018-02-28 RX ORDER — AMLODIPINE BESYLATE 5 MG/1
5 TABLET ORAL 2 TIMES DAILY
Qty: 90 TABLET | Refills: 0 | Status: SHIPPED | OUTPATIENT
Start: 2018-02-28 | End: 2018-04-06 | Stop reason: SDUPTHER

## 2018-02-28 NOTE — PATIENT INSTRUCTIONS
Hypertension- Antihypertensive regimen includes amlodipine 5mg daily, metoprolol 100mg twice a day, and torsemide 20mg daily  Avoid salt in your diet  Exercise as able  Avoid NSAIDs (advil, aleve, motrin, ibuprofen, Excedrin, naproxen, mobic)  Please increase amlodipine to 5mg twice a day        Volume status / Edema- Acceptable      Chronic Kidney Disease stage III- Baseline creatinine in the mid 1s      Follow up with me in 1 month and Dr Rachel Morataya in May  Please call the office with any questions or concerns

## 2018-02-28 NOTE — PROGRESS NOTES
Assessment and Plan:    Ricardo Ariza was seen today for follow-up  Diagnoses and all orders for this visit:    Chronic kidney disease, stage III (moderate)    Parenchymal renal hypertension, stage 1 through stage 4 or unspecified chronic kidney disease      Hypertension- Antihypertensive regimen includes amlodipine 5mg daily, metoprolol 100mg twice a day, and torsemide 20mg daily  Avoid salt in your diet  Exercise as able  Avoid NSAIDs (advil, aleve, motrin, ibuprofen, Excedrin, naproxen, mobic)  Please increase amlodipine to 5mg twice a day  Volume status / Edema- Acceptable  Chronic Kidney Disease stage III- Baseline creatinine in the mid 1s  Follow up with me in 1 month and Dr Ranjit Lovell in May  Please call the office with any questions or concerns  Reason for Visit: Follow-up (bp check)    HPI: Olga Henderson is a 80 y o  female who is here for follow up of hypertension  She saw Dr Ranjit Lovell a month ago  He increased her amlodipine to 5mg daily  Her blood pressures are elevated still ranging from 160-180 /80s sitting and 140s-150s /70s standing  She denies dizziness or lightheadedness  ROS: A complete review of systems was performed and was negative unless otherwise noted in the history of present illness      Allergies:   Ace inhibitors and Lisinopril    Medications:     Current Outpatient Prescriptions:     allopurinol (ZYLOPRIM) 100 mg tablet, Take 100 mg by mouth daily, Disp: , Rfl:     allopurinol (ZYLOPRIM) 300 mg tablet, Take 1 tablet by mouth daily, Disp: , Rfl:     amLODIPine (NORVASC) 5 mg tablet, Take 1 tablet (5 mg total) by mouth daily, Disp: 90 tablet, Rfl: 3    aspirin (ASPIRIN LOW DOSE) 81 MG tablet, Take 1 tablet by mouth daily, Disp: , Rfl:     cholecalciferol (VITAMIN D3) 1,000 units tablet, Take 1,000 Units by mouth, Disp: , Rfl:     clopidogrel (PLAVIX) 75 mg tablet, Take 75 mg by mouth daily, Disp: , Rfl:     docusate sodium (COLACE) 100 mg capsule, Take 1 capsule by mouth daily, Disp: , Rfl:     ferrous sulfate 325 (65 FE) MG EC tablet, Take 1 capsule by mouth daily, Disp: , Rfl:     metoprolol succinate (TOPROL-XL) 100 mg 24 hr tablet, Take 1 tablet by mouth 2 (two) times a day, Disp: , Rfl:     sitaGLIPtin (JANUVIA) 50 mg tablet, Take 1 tablet by mouth daily, Disp: , Rfl:     torsemide (DEMADEX) 20 mg tablet, Take 1 tablet (20 mg total) by mouth daily, Disp: 30 tablet, Rfl: 5    Past Medical History:   Diagnosis Date    Diabetes mellitus ([facility] [address] )     Hypertension      Past Surgical History:   Procedure Laterality Date    VALVE REPLACEMENT       Family History   Problem Relation Age of Onset    Heart disease Mother     Cancer Father       reports that she has never smoked  She has never used smokeless tobacco  She reports that she does not drink alcohol or use drugs  Physical Exam:   Vitals:    02/28/18 1001 02/28/18 1012 02/28/18 1014   BP:  168/80 164/72   BP Location:  Right arm Right arm   Patient Position:  Sitting Standing   Cuff Size:  Large Large   Pulse:  80    Weight: 87 1 kg (192 lb)     Height: 5' 5" (1 651 m)       Body mass index is 31 95 kg/m²  General: NAD  Neuro: AAO  Neck: supple  Skin: no rash  Heart: RRR  Lungs: CTAB  Abdomen: soft, nt, nd  Extremities: non pitting edema    Procedure:  No results found for this or any previous visit  Labs reviewed

## 2018-02-28 NOTE — LETTER
February 28, 2018     [facility], [address] [facility] [address] [facility]  [address] [address]  [address] [facility]    Patient: Kyaw Hopkins   YOB: 1931   Date of Visit: 2/28/2018       Dear Dr To Ambrose:    Thank you for referring Kyaw Hopkins to me for evaluation  Below are my notes for this consultation  If you have questions, please do not hesitate to call me  I look forward to following your patient along with you  Sincerely,        Tanner Penn PA-C        CC: No Recipients  Teresa Galicia  2/28/2018 10:17 AM  Sign at close encounter  Assessment and Plan:    Rigoberto Mackenzie was seen today for follow-up  Diagnoses and all orders for this visit:    Chronic kidney disease, stage III (moderate)    Parenchymal renal hypertension, stage 1 through stage 4 or unspecified chronic kidney disease      Hypertension- Antihypertensive regimen includes amlodipine 5mg daily, metoprolol 100mg twice a day, and torsemide 20mg daily  Avoid salt in your diet  Exercise as able  Avoid NSAIDs (advil, aleve, motrin, ibuprofen, Excedrin, naproxen, mobic)  Please increase amlodipine to 5mg twice a day  Volume status / Edema- Acceptable  Chronic Kidney Disease stage III- Baseline creatinine in the mid 1s  Follow up with me in 1 month and Dr Maddison Galicia in May  Please call the office with any questions or concerns  Reason for Visit: Follow-up (bp check)    HPI: Kyaw Hopkins is a 80 y o  female who is here for follow up of hypertension  She saw Dr Maddison Galicia a month ago  He increased her amlodipine to 5mg daily  Her blood pressures are elevated still ranging from 160-180 /80s sitting and 140s-150s /70s standing  She denies dizziness or lightheadedness  ROS: A complete review of systems was performed and was negative unless otherwise noted in the history of present illness      Allergies:   Ace inhibitors and Lisinopril    Medications:     Current Outpatient Prescriptions:     allopurinol (ZYLOPRIM) 100 mg tablet, Take 100 mg by mouth daily, Disp: , Rfl:     allopurinol (ZYLOPRIM) 300 mg tablet, Take 1 tablet by mouth daily, Disp: , Rfl:     amLODIPine (NORVASC) 5 mg tablet, Take 1 tablet (5 mg total) by mouth daily, Disp: 90 tablet, Rfl: 3    aspirin (ASPIRIN LOW DOSE) 81 MG tablet, Take 1 tablet by mouth daily, Disp: , Rfl:     cholecalciferol (VITAMIN D3) 1,000 units tablet, Take 1,000 Units by mouth, Disp: , Rfl:     clopidogrel (PLAVIX) 75 mg tablet, Take 75 mg by mouth daily, Disp: , Rfl:     docusate sodium (COLACE) 100 mg capsule, Take 1 capsule by mouth daily, Disp: , Rfl:     ferrous sulfate 325 (65 FE) MG EC tablet, Take 1 capsule by mouth daily, Disp: , Rfl:     metoprolol succinate (TOPROL-XL) 100 mg 24 hr tablet, Take 1 tablet by mouth 2 (two) times a day, Disp: , Rfl:     sitaGLIPtin (JANUVIA) 50 mg tablet, Take 1 tablet by mouth daily, Disp: , Rfl:     torsemide (DEMADEX) 20 mg tablet, Take 1 tablet (20 mg total) by mouth daily, Disp: 30 tablet, Rfl: 5    Past Medical History:   Diagnosis Date    [facility] [address] )     Hypertension      Past Surgical History:   Procedure Laterality Date    VALVE REPLACEMENT       Family History   Problem Relation Age of Onset    Heart disease Mother     Cancer Father       reports that she has never smoked  She has never used smokeless tobacco  She reports that she does not drink alcohol or use drugs  Physical Exam:   Vitals:    02/28/18 1001 02/28/18 1012 02/28/18 1014   BP:  168/80 164/72   BP Location:  Right arm Right arm   Patient Position:  Sitting Standing   Cuff Size:  Large Large   Pulse:  80    Weight: 87 1 kg (192 lb)     Height: 5' 5" (1 651 m)       Body mass index is 31 95 kg/m²  General: NAD  Neuro: AAO  Neck: supple  Skin: no rash  Heart: RRR  Lungs: CTAB  Abdomen: soft, nt, nd  Extremities: non pitting edema    Procedure:  No results found for this or any previous visit  Labs reviewed

## 2018-03-29 ENCOUNTER — OFFICE VISIT (OUTPATIENT)
Dept: NEPHROLOGY | Facility: CLINIC | Age: 83
End: 2018-03-29
Payer: MEDICARE

## 2018-03-29 VITALS
HEART RATE: 80 BPM | DIASTOLIC BLOOD PRESSURE: 80 MMHG | SYSTOLIC BLOOD PRESSURE: 148 MMHG | HEIGHT: 65 IN | BODY MASS INDEX: 31.96 KG/M2 | WEIGHT: 191.8 LBS

## 2018-03-29 DIAGNOSIS — N18.30 CHRONIC KIDNEY DISEASE, STAGE III (MODERATE) (HCC): Primary | ICD-10-CM

## 2018-03-29 DIAGNOSIS — N18.30 BENIGN HYPERTENSION WITH CHRONIC KIDNEY DISEASE, STAGE III (HCC): ICD-10-CM

## 2018-03-29 DIAGNOSIS — I12.9 BENIGN HYPERTENSION WITH CHRONIC KIDNEY DISEASE, STAGE III (HCC): ICD-10-CM

## 2018-03-29 PROCEDURE — 99213 OFFICE O/P EST LOW 20 MIN: CPT | Performed by: PHYSICIAN ASSISTANT

## 2018-03-29 NOTE — PATIENT INSTRUCTIONS
Hypertension- Antihypertensive regimen includes amlodipine 5mg twice a day, metoprolol 100mg twice a day, and torsemide 20mg daily  Avoid salt in your diet  Exercise as able  Avoid NSAIDs (advil, aleve, motrin, ibuprofen, Excedrin, naproxen, mobic)  She has orthostatic changes but is asymptomatic  Please increase torsemide to 20mg twice a day  Please call the office if your blood pressures are consistently above 623 systolic  Volume status / Edema- Increase torsemide to 20mg twice a day  Please call our office if you get lightheaded or dizzy or if you are not urinating any more  Please continue to weigh yourself every day and call if you gain 5 pounds in a week  Chronic Kidney Disease stage III- Baseline creatinine in the mid 1s  Follow up with Dr Princess Ordonez in June  Please call the office with any questions or concerns  Please have your blood work completed before that appointment

## 2018-03-29 NOTE — LETTER
March 29, 2018     Mac Mahoneykatiuska, [address] [place] [address]  [address]  [place] [address]    Patient: Angel Juárez   YOB: 1931   Date of Visit: 3/29/2018       Dear Dr Riley Rank:    Thank you for referring Angel Juárez to me for evaluation  Below are my notes for this consultation  If you have questions, please do not hesitate to call me  I look forward to following your patient along with you  Sincerely,        Maru Johnson PA-C        CC: No Recipients  Maru Johnson, [place]  3/29/2018  2:18 PM  Sign at close encounter  Assessment and Plan:    Elena Quintana was seen today for follow-up  Diagnoses and all orders for this visit:    Chronic kidney disease, stage III (moderate)    Benign hypertension with chronic kidney disease, stage III      Hypertension- Antihypertensive regimen includes amlodipine 5mg twice a day, metoprolol 100mg twice a day, and torsemide 20mg daily  Avoid salt in your diet  Exercise as able  Avoid NSAIDs (advil, aleve, motrin, ibuprofen, Excedrin, naproxen, mobic)  She has orthostatic changes but is asymptomatic  Please increase torsemide to 20mg twice a day  Please call the office if your blood pressures are consistently above 440 systolic  Volume status / Edema- Increase torsemide to 20mg twice a day  Please call our office if you get lightheaded or dizzy or if you are not urinating any more  Please continue to weigh yourself every day and call if you gain 5 pounds in a week  Chronic Kidney Disease stage III- Baseline creatinine in the mid 1s  Follow up with Dr Kasey Long in May/June  Please call the office with any questions or concerns  Please have your blood work completed before that appointment  Reason for Visit: Follow-up (BP CHECK)    HPI: Angel Juárez is a 80 y o  female who is here for follow up of hypertension  At her last visit, I increased her amlodipine to twice a day  She has an appointment next week with her PCP    She feels she has not gained or lost weight  Her LE edema is at baseline according to her  Her blood pressure at home before medications ranges from 118-048E systolic sitting and 758-598 systolic standing  She does not get lightheadedness or dizziness  She denies GI symptoms and urinary complaints  ROS: A complete review of systems was performed and was negative unless otherwise noted in the history of present illness  Allergies:   Ace inhibitors and Lisinopril    Medications:     Current Outpatient Prescriptions:     allopurinol (ZYLOPRIM) 100 mg tablet, Take 100 mg by mouth daily, Disp: , Rfl:     allopurinol (ZYLOPRIM) 300 mg tablet, Take 1 tablet by mouth daily, Disp: , Rfl:     amLODIPine (NORVASC) 5 mg tablet, Take 1 tablet (5 mg total) by mouth 2 (two) times a day, Disp: 90 tablet, Rfl: 0    aspirin (ASPIRIN LOW DOSE) 81 MG tablet, Take 1 tablet by mouth daily, Disp: , Rfl:     cholecalciferol (VITAMIN D3) 1,000 units tablet, Take 1,000 Units by mouth, Disp: , Rfl:     clopidogrel (PLAVIX) 75 mg tablet, Take 75 mg by mouth daily, Disp: , Rfl:     docusate sodium (COLACE) 100 mg capsule, Take 1 capsule by mouth daily, Disp: , Rfl:     ferrous sulfate 325 (65 FE) MG EC tablet, Take 1 capsule by mouth daily, Disp: , Rfl:     metoprolol succinate (TOPROL-XL) 100 mg 24 hr tablet, Take 1 tablet by mouth 2 (two) times a day, Disp: , Rfl:     sitaGLIPtin (JANUVIA) 50 mg tablet, Take 1 tablet by mouth daily, Disp: , Rfl:     torsemide (DEMADEX) 20 mg tablet, Take 1 tablet (20 mg total) by mouth daily, Disp: 30 tablet, Rfl: 5    Past Medical History:   Diagnosis Date    Diabetes mellitus ([address] )     Hypertension      Past Surgical History:   Procedure Laterality Date    VALVE REPLACEMENT       Family History   Problem Relation Age of Onset    Heart disease Mother     Cancer Father       reports that she has never smoked   She has never used smokeless tobacco  She reports that she does not drink alcohol or use drugs  Physical Exam:   Vitals:    03/29/18 1350 03/29/18 1401 03/29/18 1402   BP:  160/80 148/80   BP Location:  Right arm Right arm   Patient Position:  Sitting Sitting   Cuff Size:  Standard Standard   Pulse:  80    Weight: 87 kg (191 lb 12 8 oz)     Height: 5' 5" (1 651 m)       Body mass index is 31 92 kg/m²  General: NAD  Neuro: AAO  Neck: supple  Skin: no rash  Heart: normocephalic atraumatic  Lungs: CTAB  Abdomen: soft, nt, nd  Extremities: + bilateral edema    Procedure:  No results found for this or any previous visit  Labs reviewed      No results found for: GLUCOSE, CALCIUM, NA, K, CO2, CL, BUN, CREATININE

## 2018-03-29 NOTE — PROGRESS NOTES
Assessment and Plan:    Shyanne Balderas was seen today for follow-up  Diagnoses and all orders for this visit:    Chronic kidney disease, stage III (moderate)    Benign hypertension with chronic kidney disease, stage III      Hypertension- Antihypertensive regimen includes amlodipine 5mg twice a day, metoprolol 100mg twice a day, and torsemide 20mg daily  Avoid salt in your diet  Exercise as able  Avoid NSAIDs (advil, aleve, motrin, ibuprofen, Excedrin, naproxen, mobic)  She has orthostatic changes but is asymptomatic  Please increase torsemide to 20mg twice a day  Please call the office if your blood pressures are consistently above 925 systolic  Volume status / Edema- Increase torsemide to 20mg twice a day  Please call our office if you get lightheaded or dizzy or if you are not urinating any more  Please continue to weigh yourself every day and call if you gain 5 pounds in a week  Chronic Kidney Disease stage III- Baseline creatinine in the mid 1s  Follow up with Dr Ana Orozco in May/June  Please call the office with any questions or concerns  Please have your blood work completed before that appointment  Reason for Visit: Follow-up (BP CHECK)    HPI: Go Soler is a 80 y o  female who is here for follow up of hypertension  At her last visit, I increased her amlodipine to twice a day  She has an appointment next week with her PCP  She feels she has not gained or lost weight  Her LE edema is at baseline according to her  Her blood pressure at home before medications ranges from 198-499T systolic sitting and 606-527 systolic standing  She does not get lightheadedness or dizziness  She denies GI symptoms and urinary complaints  ROS: A complete review of systems was performed and was negative unless otherwise noted in the history of present illness      Allergies:   Ace inhibitors and Lisinopril    Medications:     Current Outpatient Prescriptions:     allopurinol (ZYLOPRIM) 100 mg tablet, Take 100 mg by mouth daily, Disp: , Rfl:     allopurinol (ZYLOPRIM) 300 mg tablet, Take 1 tablet by mouth daily, Disp: , Rfl:     amLODIPine (NORVASC) 5 mg tablet, Take 1 tablet (5 mg total) by mouth 2 (two) times a day, Disp: 90 tablet, Rfl: 0    aspirin (ASPIRIN LOW DOSE) 81 MG tablet, Take 1 tablet by mouth daily, Disp: , Rfl:     cholecalciferol (VITAMIN D3) 1,000 units tablet, Take 1,000 Units by mouth, Disp: , Rfl:     clopidogrel (PLAVIX) 75 mg tablet, Take 75 mg by mouth daily, Disp: , Rfl:     docusate sodium (COLACE) 100 mg capsule, Take 1 capsule by mouth daily, Disp: , Rfl:     ferrous sulfate 325 (65 FE) MG EC tablet, Take 1 capsule by mouth daily, Disp: , Rfl:     metoprolol succinate (TOPROL-XL) 100 mg 24 hr tablet, Take 1 tablet by mouth 2 (two) times a day, Disp: , Rfl:     sitaGLIPtin (JANUVIA) 50 mg tablet, Take 1 tablet by mouth daily, Disp: , Rfl:     torsemide (DEMADEX) 20 mg tablet, Take 1 tablet (20 mg total) by mouth daily, Disp: 30 tablet, Rfl: 5    Past Medical History:   Diagnosis Date    Diabetes mellitus ([address] )     Hypertension      Past Surgical History:   Procedure Laterality Date    VALVE REPLACEMENT       Family History   Problem Relation Age of Onset    Heart disease Mother     Cancer Father       reports that she has never smoked  She has never used smokeless tobacco  She reports that she does not drink alcohol or use drugs  Physical Exam:   Vitals:    03/29/18 1350 03/29/18 1401 03/29/18 1402   BP:  160/80 148/80   BP Location:  Right arm Right arm   Patient Position:  Sitting Sitting   Cuff Size:  Standard Standard   Pulse:  80    Weight: 87 kg (191 lb 12 8 oz)     Height: 5' 5" (1 651 m)       Body mass index is 31 92 kg/m²  General: NAD  Neuro: AAO  Neck: supple  Skin: no rash  Heart: normocephalic atraumatic  Lungs: CTAB  Abdomen: soft, nt, nd  Extremities: + bilateral edema    Procedure:  No results found for this or any previous visit    Labs reviewed      No results found for: GLUCOSE, CALCIUM, NA, K, CO2, CL, BUN, CREATININE

## 2018-04-06 DIAGNOSIS — I12.9 NEPHROSCLEROSIS, STAGE 1 THROUGH STAGE 4 OR UNSPECIFIED CHRONIC KIDNEY DISEASE: ICD-10-CM

## 2018-04-06 DIAGNOSIS — I12.9 PARENCHYMAL RENAL HYPERTENSION, STAGE 1 THROUGH STAGE 4 OR UNSPECIFIED CHRONIC KIDNEY DISEASE: ICD-10-CM

## 2018-04-06 RX ORDER — AMLODIPINE BESYLATE 5 MG/1
5 TABLET ORAL 2 TIMES DAILY
Qty: 60 TABLET | Refills: 5 | Status: SHIPPED | OUTPATIENT
Start: 2018-04-06 | End: 2018-04-09 | Stop reason: SDUPTHER

## 2018-04-06 RX ORDER — TORSEMIDE 20 MG/1
20 TABLET ORAL 2 TIMES DAILY
Qty: 30 TABLET | Refills: 0 | Status: SHIPPED | OUTPATIENT
Start: 2018-04-06 | End: 2018-05-18 | Stop reason: SDUPTHER

## 2018-04-09 DIAGNOSIS — I12.9 PARENCHYMAL RENAL HYPERTENSION, STAGE 1 THROUGH STAGE 4 OR UNSPECIFIED CHRONIC KIDNEY DISEASE: ICD-10-CM

## 2018-04-09 RX ORDER — AMLODIPINE BESYLATE 5 MG/1
5 TABLET ORAL 2 TIMES DAILY
Qty: 60 TABLET | Refills: 4 | Status: SHIPPED | OUTPATIENT
Start: 2018-04-09 | End: 2018-06-18 | Stop reason: SDUPTHER

## 2018-05-18 DIAGNOSIS — I12.9 NEPHROSCLEROSIS, STAGE 1 THROUGH STAGE 4 OR UNSPECIFIED CHRONIC KIDNEY DISEASE: ICD-10-CM

## 2018-05-21 RX ORDER — TORSEMIDE 20 MG/1
20 TABLET ORAL 2 TIMES DAILY
Qty: 180 TABLET | Refills: 3 | Status: SHIPPED | OUTPATIENT
Start: 2018-05-21 | End: 2018-10-25 | Stop reason: SDUPTHER

## 2018-06-04 ENCOUNTER — DOCUMENTATION (OUTPATIENT)
Dept: NEPHROLOGY | Facility: CLINIC | Age: 83
End: 2018-06-04

## 2018-06-04 LAB
ALBUMIN SNV-MCNC: 4 G/DL
BASOPHILS NFR MAR MANUAL: 1 % (ref 0–1)
CO2 SERPL-SCNC: 28 MMOL/L
CREAT UR-MCNC: 80.7 MG/DL
EOSINOPHIL NFR BLD MANUAL: 4 % (ref 0–6)
EXT DIFF-ABS BASOPHILS: 0.1
EXT DIFF-ABS EOSINOPHILS: 0.3
EXT DIFF-ABS LYMPHOCYTES: 1.3
EXT DIFF-ABS MONOCYTES: 0.4
EXT DIFF-ABS NEUTROPHILS: 5.6
EXT GLUCOSE BLD: 198
EXTERNAL ALBUMIN: 4
EXTERNAL ALK PHOS: 97
EXTERNAL ALT: 28
EXTERNAL ANION GAP: 10
EXTERNAL AST: 17
EXTERNAL BUN: 30
EXTERNAL CALCIUM: 9.5
EXTERNAL CHLORIDE: 100
EXTERNAL CREATININE: 1.45
EXTERNAL EGFR: 33
EXTERNAL HEMATOCRIT: 38 %
EXTERNAL HEMOGLOBIN: 12.7 G/DL
EXTERNAL MCV: 92
EXTERNAL MCV: 92
EXTERNAL PLATELET COUNT: 179 K/ΜL
EXTERNAL POTASSIUM: 3.7
EXTERNAL PTH: 53.8
EXTERNAL RBC: 4.09
EXTERNAL RDW: 16
EXTERNAL SODIUM: 138
EXTERNAL T.BILIRUBIN: 0.4
EXTERNAL TOTAL PROTEIN: 7.7
EXTERNAL WBC: 7.8
LYMPHOCYTES # BLD AUTO: 17 %
MAGNESIUM SERPL-MCNC: 2 MG/DL (ref 1.6–2.6)
MCH RBC QN AUTO: 31 PG (ref 26.8–34.3)
MCHC RBC AUTO-ENTMCNC: 33.7 G/DL (ref 31.4–37.4)
MONOCYTES NFR BLD AUTO: 6 % (ref 4–12)
NEUTS SEG NFR BLD AUTO: 72 %
PHOSPHATE SERPL-MCNC: 3.1 MG/DL (ref 2.3–4.1)
PMV BLD AUTO: 8.6 FL (ref 8.9–12.7)
PROT SNV-MCNC: 40.2 MG/DL
PROT/CREAT 24H UR: 0.5 MG/G{CREAT}

## 2018-06-18 ENCOUNTER — OFFICE VISIT (OUTPATIENT)
Dept: NEPHROLOGY | Facility: CLINIC | Age: 83
End: 2018-06-18
Payer: MEDICARE

## 2018-06-18 VITALS — WEIGHT: 194 LBS | BODY MASS INDEX: 32.32 KG/M2 | HEIGHT: 65 IN

## 2018-06-18 DIAGNOSIS — N18.30 BENIGN HYPERTENSION WITH CHRONIC KIDNEY DISEASE, STAGE III (HCC): Primary | ICD-10-CM

## 2018-06-18 DIAGNOSIS — I12.9 PARENCHYMAL RENAL HYPERTENSION, STAGE 1 THROUGH STAGE 4 OR UNSPECIFIED CHRONIC KIDNEY DISEASE: ICD-10-CM

## 2018-06-18 DIAGNOSIS — N18.30 CHRONIC KIDNEY DISEASE, STAGE III (MODERATE) (HCC): ICD-10-CM

## 2018-06-18 DIAGNOSIS — I12.9 BENIGN HYPERTENSION WITH CHRONIC KIDNEY DISEASE, STAGE III (HCC): Primary | ICD-10-CM

## 2018-06-18 DIAGNOSIS — E87.79 OTHER HYPERVOLEMIA: ICD-10-CM

## 2018-06-18 DIAGNOSIS — E78.5 DYSLIPIDEMIA: ICD-10-CM

## 2018-06-18 DIAGNOSIS — R60.0 LOCALIZED EDEMA: ICD-10-CM

## 2018-06-18 PROCEDURE — 99214 OFFICE O/P EST MOD 30 MIN: CPT | Performed by: INTERNAL MEDICINE

## 2018-06-18 RX ORDER — AMLODIPINE BESYLATE 5 MG/1
5 TABLET ORAL
Qty: 60 TABLET | Refills: 0 | Status: SHIPPED | OUTPATIENT
Start: 2018-06-18 | End: 2019-05-07

## 2018-06-18 RX ORDER — LOSARTAN POTASSIUM 25 MG/1
25 TABLET ORAL
Qty: 30 TABLET | Refills: 5 | Status: SHIPPED | OUTPATIENT
Start: 2018-06-18 | End: 2018-09-26 | Stop reason: SDUPTHER

## 2018-06-18 NOTE — PROGRESS NOTES
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   Baseline creatinine is 1 41 mg/dL  Current creatinine:  1 45 mg/dL 05/30/2018  Upc:  0 5 g  Recommendations:  -continue treating hypertension please see below  -continue treating dyslipidemia  -avoid nephrotoxic agents such as NSAIDs  2   Volume:  Patient has ongoing edema  In part this may have been related to increase amlodipine  Recommendations: For completeness I would check a TSH in venous duplex  -decrease amlodipine to just 5 mg a day  -continue current torsemide dose  -add losartan 25 mg at bedtime daily  -recheck basic metabolic profile 1 week later  -check swelling in the next few weeks with 1 of my advanced practitioner's  3  Hypertension:  Blood pressure is:  -a m :  161/73, standing 140/65  -p m :  165/70, standing 152/66  Again persistent systolic hypertension with low diastolics at times, and only mild orthostatic changes but no orthostatic symptoms  At this point I would make the above recommendations regarding the medication changes  We need to monitor her renal function closely with this angiotensin receptor 2 blocker  I am using it at this time given the edema related to the amlodipine  4   Electrolytes:  Acceptable  5   Mineral bone disorder:  Acceptable at this time including PTH intact level  6  Anemia:  Hemoglobin remains normal   7   Dyslipidemia/diabetes mellitus per your discretion  8   Status post aortic valve replacement followed by Cardiology  PATIENT INSTRUCTIONS:    Patient Instructions   1  Medication changes today:  -decrease amlodipine to just 5 mg in the morning  -begin on losartan 25 mg at nighttime as a new blood pressure pill, if he noticed any new symptoms please feel free to call  2  Please wait 1 week after making the medication changes and go for nonfasting lab work  3    In regards to your swelling:  -avoid salt  -keep legs elevated when seated  -if you are on your feet for quite a long time you can consider knee-high support hose  4  We will arrange for an ultrasound of your legs to make sure you have no blood clots  5   Please purchase a new Omron blood pressure machine:  Omron 10 series-785n through [employer] or get another 1 through the [employer]  Please wait 2 weeks after making the above medication changes and take 1 week a blood pressure readings morning and evening  In addition do sitting and standing  Please bring in both the readings as well as your new machine to see 1 of my advanced practitioner's in the next 3-4 weeks  6   Follow-up in 4 months:  -please bring in 1 week a blood pressure readings morning and evening, sitting and standing  -please go for fasting lab work prior to your appointment  7  General instructions:  -avoid salt  -avoid medications such as Motrin, Naprosyn, ibuprofen, Aleve or Advil or Celebrex as they can affect your kidney function; you can use Tylenol as needed for pain or fevers if you have no liver problems  -avoid medications such as Sudafed or other medications with decongestants as they can raise your blood pressure  -try to exercise at least 30 minutes at least 3 days a week with an ultimate goal of 5 days a week  -try to lose 5-10 lb by your next visit  Please bring in your readings        Subjective:   Patient is noted swelling over the last few months of her hands and feet  She is on increased amlodipine 5 mg twice a day and the torsemide was increased by [facility] to 20 mg twice a day with no significant change in swelling  Patient denies any shortness of breath or chest pain    No fevers chills cough or colds  No active urinary symptoms  No GI symptoms  No headaches dizziness or lightheadedness  Blood pressure medications:  -amlodipine 5 mg twice a day  -Toprol  mg twice a day  -torsemide 20 mg b i d     ROS:  See HPI, otherwise review of systems as completely reviewed with the patient are negative    Past Medical History:   Diagnosis Date    Diabetes mellitus ([address] )     Hypertension      Past Surgical History:   Procedure Laterality Date    VALVE REPLACEMENT       Family History   Problem Relation Age of Onset    Heart disease Mother     Cancer Father       reports that she has never smoked  She has never used smokeless tobacco  She reports that she does not drink alcohol or use drugs  I COMPLETELY REVIEWED THE PAST MEDICAL HISTORY/PAST SURGICAL HISTORY/SOCIAL HISTORY/FAMILY HISTORY/AND MEDICATIONS  AND UPDATED ALL    Objective:     Vitals:    BP sitting on left:  180/62 with a heart rate of 80 and slightly irregular; same on the right  BP standing on left:  180/70 with a heart rate of 84 and slightly irregular    Weight (last 2 days)     Date/Time   Weight    06/18/18 1132  88 (194)            Body mass index is 32 28 kg/m²      Physical Exam: General:  Obese, No acute distress  Skin:  No acute rash  Eyes:  No scleral icterus, noninjected  ENT:  Moist mucous membranes  Neck:  Supple, no jugular venous distention  Back   No CVAT  Chest:  Clear to auscultation and percussion  CVS:  Appears regular but occasional skipped beat, no rub or gallops appreciable  Abdomen:  Obese, Soft and nontender with normal bowel sounds  Extremities:  No cyanosis and 1+ lower extremity edema bilateral 2/3 the way up the pretibial region  Neuro:  Grossly intact  Psych:  Alert, oriented x3 and appropriate      Medications:    Current Outpatient Prescriptions:     allopurinol (ZYLOPRIM) 100 mg tablet, Take 100 mg by mouth daily, Disp: , Rfl:     allopurinol (ZYLOPRIM) 300 mg tablet, Take 1 tablet by mouth daily, Disp: , Rfl:     amLODIPine (NORVASC) 5 mg tablet, Take 1 tablet (5 mg total) by mouth daily in the early morning, Disp: 60 tablet, Rfl: 0    aspirin (ASPIRIN LOW DOSE) 81 MG tablet, Take 1 tablet by mouth daily, Disp: , Rfl:     cholecalciferol (VITAMIN D3) 1,000 units tablet, Take 1,000 Units by mouth, Disp: , Rfl:     clopidogrel (PLAVIX) 75 mg tablet, Take 75 mg by mouth daily, Disp: , Rfl:     docusate sodium (COLACE) 100 mg capsule, Take 1 capsule by mouth daily, Disp: , Rfl:     ferrous sulfate 325 (65 FE) MG EC tablet, Take 1 capsule by mouth daily, Disp: , Rfl:     metoprolol succinate (TOPROL-XL) 100 mg 24 hr tablet, Take 1 tablet by mouth 2 (two) times a day, Disp: , Rfl:     sitaGLIPtin (JANUVIA) 50 mg tablet, Take 1 tablet by mouth daily, Disp: , Rfl:     torsemide (DEMADEX) 20 mg tablet, Take 1 tablet (20 mg total) by mouth 2 (two) times a day, Disp: 180 tablet, Rfl: 3    losartan (COZAAR) 25 mg tablet, Take 1 tablet (25 mg total) by mouth daily after dinner, Disp: 30 tablet, Rfl: 5    Lab, Imaging and other studies: I have personally reviewed pertinent labs    Laboratory Results:  Results for orders placed or performed in visit on 06/04/18   CBC and differential   Result Value Ref Range    External WBC 7 8     External RBC 4 09     External Hemoglobin 12 7 g/dL    External Hematocrit 38 %    External MCV 92     External RDW 16     External Platelets 554 K/µL    External Abs Neutrophils 5 6     External Abs Lymphocytes 1 3     External Abs Monocytes  0 4     External Abs Eosinophils 0 3     External Abs Basophils  0 1     Neutrophils % (Fluid) 72 %    Lymphocytes % 17 %    Monocytes % 6 4 - 12 %    Eosinophils % 4 0 - 6 %    Basophils % 1 0 - 1 %    External MCV 92     MCH 31 0 26 8 - 34 3 pg    MCHC 33 7 31 4 - 37 4 g/dL    MPV 8 6 (A) 8 9 - 12 7 fL   Comprehensive metabolic panel   Result Value Ref Range    EXTERNAL SODIUM 138     EXTERNAL POTASSIUM 3 7     EXTERNAL CHLORIDE 100     SL AMB CARBON DIOXIDE 28     EXTERNAL ANION GAP 10     EXTERNAL BUN 30     EXTERNAL CREATININE 1 45     EXT Glucose Bld 198     EXTERNAL CALCIUM 9 5     EXTERNAL TOTAL PROTEIN 7 7     EXTERNAL ALBUMIN 4 0     EXTERNAL AST 17     EXTERNAL ALT 28     EXTERNAL ALK PHOS 97 EXTERNAL TOT  BILIRUBIN 0 4     EXTERNAL EGFR 33     Albumin 4 0     Protein/Creatinine Ratio 0 50     Protein, Total Urine 40 2 mg/dL    Creatinine, Urine 80 7    PTH, intact   Result Value Ref Range    EXTERNAL PTH 53 8     Magnesium 2 0 1 6 - 2 6 mg/dL    Phosphorus 3 1 2 3 - 4 1 mg/dL               Radiology review:   chest X-ray    Ultrasound      Portions of the record may have been created with voice recognition software   Occasional wrong word or "sound a like" substitutions may have occurred due to the inherent limitations of voice recognition software   Read the chart carefully and recognize, using context, where substitutions have occurred

## 2018-06-18 NOTE — PATIENT INSTRUCTIONS
1   Medication changes today:  -decrease amlodipine to just 5 mg in the morning  -begin on losartan 25 mg at nighttime as a new blood pressure pill, if he noticed any new symptoms please feel free to call  2  Please wait 1 week after making the medication changes and go for nonfasting lab work  3  In regards to your swelling:  -avoid salt  -keep legs elevated when seated  -if you are on your feet for quite a long time you can consider knee-high support hose  4  We will arrange for an ultrasound of your legs to make sure you have no blood clots  5   Please purchase a new Omron blood pressure machine:  Omron 10 series-785n through [employer] or get another 1 through the [employer]  Please wait 2 weeks after making the above medication changes and take 1 week a blood pressure readings morning and evening  In addition do sitting and standing  Please bring in both the readings as well as your new machine to see 1 of my advanced practitioner's in the next 3-4 weeks  6   Follow-up in 4 months:  -please bring in 1 week a blood pressure readings morning and evening, sitting and standing  -please go for fasting lab work prior to your appointment  7  General instructions:  -avoid salt  -avoid medications such as Motrin, Naprosyn, ibuprofen, Aleve or Advil or Celebrex as they can affect your kidney function; you can use Tylenol as needed for pain or fevers if you have no liver problems  -avoid medications such as Sudafed or other medications with decongestants as they can raise your blood pressure  -try to exercise at least 30 minutes at least 3 days a week with an ultimate goal of 5 days a week  -try to lose 5-10 lb by your next visit  Please bring in your readings

## 2018-06-18 NOTE — LETTER
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   Baseline creatinine is 1 41 mg/dL  Current creatinine:  1 45 mg/dL 05/30/2018  Upc:  0 5 g  Recommendations:  -continue treating hypertension please see below  -continue treating dyslipidemia  -avoid nephrotoxic agents such as NSAIDs  2   Volume:  Patient has ongoing edema  In part this may have been related to increase amlodipine  Recommendations: For completeness I would check a TSH in venous duplex  -decrease amlodipine to just 5 mg a day  -continue current torsemide dose  -add losartan 25 mg at bedtime daily  -recheck basic metabolic profile 1 week later  -check swelling in the next few weeks with 1 of my advanced practitioner's  3  Hypertension:  Blood pressure is:  -a m :  161/73, standing 140/65  -p m :  165/70, standing 152/66  Again persistent systolic hypertension with low diastolics at times, and only mild orthostatic changes but no orthostatic symptoms  At this point I would make the above recommendations regarding the medication changes  We need to monitor her renal function closely with this angiotensin receptor 2 blocker  I am using it at this time given the edema related to the amlodipine  4   Electrolytes:  Acceptable    5   Mineral bone disorder:  Acceptable at this time including PTH intact level  6  Anemia:  Hemoglobin remains normal   7   Dyslipidemia/diabetes mellitus per your discretion  8   Status post aortic valve replacement followed by Cardiology  PATIENT INSTRUCTIONS:    Patient Instructions   1  Medication changes today:  -decrease amlodipine to just 5 mg in the morning  -begin on losartan 25 mg at nighttime as a new blood pressure pill, if he noticed any new symptoms please feel free to call  2  Please wait 1 week after making the medication changes and go for nonfasting lab work  3  In regards to your swelling:  -avoid salt  -keep legs elevated when seated  -if you are on your feet for quite a long time you can consider knee-high support hose  4  We will arrange for an ultrasound of your legs to make sure you have no blood clots  5   Please purchase a new Omron blood pressure machine:  Omron 10 [id number] through [employer] or get another 1 through the [employer]  Please wait 2 weeks after making the above medication changes and take 1 week a blood pressure readings morning and evening  In addition do sitting and standing  Please bring in both the readings as well as your new machine to see 1 of my advanced practitioner's in the next 3-4 weeks  6   Follow-up in 4 months:  -please bring in 1 week a blood pressure readings morning and evening, sitting and standing  -please go for fasting lab work prior to your appointment  7  General instructions:  -avoid salt  -avoid medications such as Motrin, Naprosyn, ibuprofen, Aleve or Advil or Celebrex as they can affect your kidney function; you can use Tylenol as needed for pain or fevers if you have no liver problems  -avoid medications such as Sudafed or other medications with decongestants as they can raise your blood pressure  -try to exercise at least 30 minutes at least 3 days a week with an ultimate goal of 5 days a week  -try to lose 5-10 lb by your next visit  Please bring in your readings        Subjective: Patient is noted swelling over the last few months of her hands and feet  She is on increased amlodipine 5 mg twice a day and the torsemide was increased by [facility] to 20 mg twice a day with no significant change in swelling  Patient denies any shortness of breath or chest pain  No fevers chills cough or colds  No active urinary symptoms  No GI symptoms  No headaches dizziness or lightheadedness  Blood pressure medications:  -amlodipine 5 mg twice a day  -Toprol  mg twice a day  -torsemide 20 mg b i d     ROS:  See HPI, otherwise review of systems as completely reviewed with the patient are negative    Past Medical History:   Diagnosis Date    Diabetes mellitus ([facility] [address] )     Hypertension      Past Surgical History:   Procedure Laterality Date    VALVE REPLACEMENT       Family History   Problem Relation Age of Onset    Heart disease Mother     Cancer Father       reports that she has never smoked  She has never used smokeless tobacco  She reports that she does not drink alcohol or use drugs  I COMPLETELY REVIEWED THE PAST MEDICAL HISTORY/PAST SURGICAL HISTORY/SOCIAL HISTORY/FAMILY HISTORY/AND MEDICATIONS  AND UPDATED ALL    Objective:     Vitals:    BP sitting on left:  180/62 with a heart rate of 80 and slightly irregular; same on the right  BP standing on left:  180/70 with a heart rate of 84 and slightly irregular    Weight (last 2 days)     Date/Time   Weight    06/18/18 1132  88 (194)            Body mass index is 32 28 kg/m²      Physical Exam: General:  Obese, No acute distress  Skin:  No acute rash  Eyes:  No scleral icterus, noninjected  ENT:  Moist mucous membranes  Neck:  Supple, no jugular venous distention  Back   No CVAT  Chest:  Clear to auscultation and percussion  CVS:  Appears regular but occasional skipped beat, no rub or gallops appreciable  Abdomen:  Obese, Soft and nontender with normal bowel sounds  Extremities:  No cyanosis and 1+ lower extremity edema bilateral 2/3 the way up the pretibial region  Neuro:  Grossly intact  Psych:  Alert, oriented x3 and appropriate      Medications:    Current Outpatient Prescriptions:     allopurinol (ZYLOPRIM) 100 mg tablet, Take 100 mg by mouth daily, Disp: , Rfl:     allopurinol (ZYLOPRIM) 300 mg tablet, Take 1 tablet by mouth daily, Disp: , Rfl:     amLODIPine (NORVASC) 5 mg tablet, Take 1 tablet (5 mg total) by mouth daily in the early morning, Disp: 60 tablet, Rfl: 0    aspirin (ASPIRIN LOW DOSE) 81 MG tablet, Take 1 tablet by mouth daily, Disp: , Rfl:     cholecalciferol (VITAMIN D3) 1,000 units tablet, Take 1,000 Units by mouth, Disp: , Rfl:     clopidogrel (PLAVIX) 75 mg tablet, Take 75 mg by mouth daily, Disp: , Rfl:     docusate sodium (COLACE) 100 mg capsule, Take 1 capsule by mouth daily, Disp: , Rfl:     ferrous sulfate 325 (65 FE) MG EC tablet, Take 1 capsule by mouth daily, Disp: , Rfl:     metoprolol succinate (TOPROL-XL) 100 mg 24 hr tablet, Take 1 tablet by mouth 2 (two) times a day, Disp: , Rfl:     sitaGLIPtin (JANUVIA) 50 mg tablet, Take 1 tablet by mouth daily, Disp: , Rfl:     torsemide (DEMADEX) 20 mg tablet, Take 1 tablet (20 mg total) by mouth 2 (two) times a day, Disp: 180 tablet, Rfl: 3    losartan (COZAAR) 25 mg tablet, Take 1 tablet (25 mg total) by mouth daily after dinner, Disp: 30 tablet, Rfl: 5    Lab, Imaging and other studies: I have personally reviewed pertinent labs    Laboratory Results:  Results for orders placed or performed in visit on 06/04/18   CBC and differential   Result Value Ref Range    External WBC 7 8     External RBC 4 09     External Hemoglobin 12 7 g/dL    External Hematocrit 38 %    External MCV 92     External RDW 16     External Platelets 740 K/µL    External Abs Neutrophils 5 6     External Abs Lymphocytes 1 3     External Abs Monocytes  0 4     External Abs Eosinophils 0 3     External Abs Basophils  0 1     Neutrophils % (Fluid) 72 %    Lymphocytes % 17 %    Monocytes % 6 4 - 12 % Eosinophils % 4 0 - 6 %    Basophils % 1 0 - 1 %    External MCV 92     MCH 31 0 26 8 - 34 3 pg    MCHC 33 7 31 4 - 37 4 g/dL    MPV 8 6 (A) 8 9 - 12 7 fL   Comprehensive metabolic panel   Result Value Ref Range    EXTERNAL SODIUM 138     EXTERNAL POTASSIUM 3 7     EXTERNAL CHLORIDE 100     SL AMB CARBON DIOXIDE 28     EXTERNAL ANION GAP 10     EXTERNAL BUN 30     EXTERNAL CREATININE 1 45     EXT Glucose Bld 198     EXTERNAL CALCIUM 9 5     EXTERNAL TOTAL PROTEIN 7 7     EXTERNAL ALBUMIN 4 0     EXTERNAL AST 17     EXTERNAL ALT 28     EXTERNAL ALK PHOS 97     EXTERNAL TOT  BILIRUBIN 0 4     EXTERNAL EGFR 33     Albumin 4 0     Protein/Creatinine Ratio 0 50     Protein, Total Urine 40 2 mg/dL    Creatinine, Urine 80 7    PTH, intact   Result Value Ref Range    EXTERNAL PTH 53 8     Magnesium 2 0 1 6 - 2 6 mg/dL    Phosphorus 3 1 2 3 - 4 1 mg/dL               Radiology review:   chest X-ray    Ultrasound      Portions of the record may have been created with voice recognition software   Occasional wrong word or "sound a like" substitutions may have occurred due to the inherent limitations of voice recognition software   Read the chart carefully and recognize, using context, where substitutions have occurred

## 2018-08-06 NOTE — PROGRESS NOTES
NEPHROLOGY OFFICE VISIT   Misty Gruber 80 y o  female MRN: [id number]  8/7/2018    Reason for Visit:  Follow-up    Interval history, subjective:    I had the pleasure of seeing Miles Ford today in the renal clinic for the continued management of chronic kidney disease and hypertension  She was last seen by Dr Jaelyn Collins in June  During that visit amlodipine was decreased to 5 mg a day and torsemide was continued  She was also started on losartan at last office visit  Since our last visit, there has been no ER visits or hospitilizations  She is concerned about increasing weight particularly increasing abdominal girth  Lower extremity edema is present, chronic in nature  She tries to elevate her legs in the afternoon  She is currently only taking torsemide daily at the suggestion of her cardiologist Dr Mark Alatorre  When she was at Dr Patti Leigh office she weighed 190 lb  She is currently 196 lb  Patient denies any chest pain, shortness of breath  The last blood work was done on 8/1/18, which we have reviewed together  ASSESSMENT and PLAN:    1  Chronic kidney disease stage 3:   · 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, arteriolar nephrosclerosis, stigmata of FELIPE  · Urine protein creatinine ratio 0 5 g   Baseline creatinine 1 4   · Creatinine 1 33 6/25/18, 1 49 8/1/18  2  Hypertension, volume status:  Blood pressure above goal-may be volume mediated  · Ongoing issues with edema  May be somewhat related to the use of amlodipine  · At last office visit amlodipine was decreased to 5 mg a day  She thinks her legs may be slightly less edematous but is not sure  · When she saw Dr Mark Alatorre he felt that edema was primarily related to amlodipine, no evidence of CHF and he decreased torsemide to 20 mg a day  Since torsemide was decreased she reports increasing abdominal girth and weight gain  I obtained Dr Patti Leigh note from his office    He notes that if we feel that she needs an increased dose of torsemide that he is okay with that change  Also blood pressure is elevated and it may be partially volume mediated  · If we feel that she is becoming volume depleted we may elect to alternate b i d  dosing with daily dosing of torsemide  · Generally, asymptomatic orthostatic decline in blood pressure by approximately 13-20 points/low diastolic readings at times  Today systolic blood pressure only decline by 6 point upon standing  · Started on losartan 25 mg HS at last office visit  · Recently purchased new home device for monitoring blood pressure-systolic readings correlated fairly well but diastolic readings were significantly higher +3/+13 (it took 2 readings)  · Weight loss encouraged  · Low-salt diet stressed  · Fluid intake acceptable  She does not seem to drink excessively  · TSH normal  3  Electrolytes: stable  Last potassium 4 2  4  CKD MBD:   · Phosphorus 3 1 on 05/30/2018  · Follow up blood work before next appointment  5  Gout:  Uric acid 5 0  Other:  Diabetes mellitus, dyslipidemia, the prior AVR        PATIENT INSTRUCTIONS:    Patient Instructions   1  No change in medications  2  We will call you regarding medication changes  3  Low salt diet  4  No NSAIDS  5  Follow up with Dr Marysol Patel  6  Blood work before next appointment    OBJECTIVE:  Current Weight: Weight - Scale: 88 9 kg (196 lb)  Vitals:    08/07/18 0907 08/07/18 0911 08/07/18 0917 08/07/18 0925   BP: 158/70 154/76 148/68 155/83   BP Location: Right arm Left arm Left arm Left arm   Patient Position: Sitting Sitting Standing    Cuff Size: Large Large     Weight:       Height:        Body mass index is 32 62 kg/m²  REVIEW OF SYSTEMS:    Review of Systems   Constitutional: Positive for unexpected weight change  Negative for activity change, appetite change, chills, diaphoresis, fatigue and fever  HENT: Negative  Eyes: Negative  Respiratory: Negative  Cardiovascular: Positive for leg swelling   Negative for chest pain and palpitations  Gastrointestinal: Positive for abdominal distention  Negative for constipation, diarrhea, nausea and vomiting  Endocrine: Negative  Genitourinary: Negative  Musculoskeletal:        Neuropathy of feet   Skin: Negative  Neurological: Negative  Hematological: Negative  Psychiatric/Behavioral: Negative  PHYSICAL EXAM:      Physical Exam   Constitutional: She is oriented to person, place, and time  She appears well-developed and well-nourished  No distress  HENT:   Head: Normocephalic and atraumatic  Eyes: Conjunctivae are normal  Right eye exhibits no discharge  Left eye exhibits no discharge  No scleral icterus  Neck: Neck supple  No JVD present  Cardiovascular: Normal rate  An irregular rhythm present  Exam reveals no gallop  No murmur heard  Pulmonary/Chest: Effort normal and breath sounds normal  No respiratory distress  She has no wheezes  She has no rales  Abdominal: Soft  Bowel sounds are normal  She exhibits distension  There is no tenderness (Mild distension)  There is no rebound and no guarding  Musculoskeletal: She exhibits edema (One to 2+ lower extremity edema bilaterally  Edema below the knees to the feet bilaterally)  Neurological: She is alert and oriented to person, place, and time  Skin: Skin is warm and dry  No rash noted  She is not diaphoretic  No erythema  No pallor  Psychiatric: She has a normal mood and affect   Her behavior is normal  Judgment and thought content normal        Medications:    Current Outpatient Prescriptions:     allopurinol (ZYLOPRIM) 100 mg tablet, Take 100 mg by mouth daily, Disp: , Rfl:     allopurinol (ZYLOPRIM) 300 mg tablet, Take 1 tablet by mouth daily, Disp: , Rfl:     amLODIPine (NORVASC) 5 mg tablet, Take 1 tablet (5 mg total) by mouth daily in the early morning, Disp: 60 tablet, Rfl: 0    aspirin (ASPIRIN LOW DOSE) 81 MG tablet, Take 1 tablet by mouth daily, Disp: , Rfl:     cholecalciferol (VITAMIN D3) 1,000 units tablet, Take 1,000 Units by mouth, Disp: , Rfl:     clopidogrel (PLAVIX) 75 mg tablet, Take 75 mg by mouth daily, Disp: , Rfl:     docusate sodium (COLACE) 100 mg capsule, Take 1 capsule by mouth daily, Disp: , Rfl:     ferrous sulfate 325 (65 FE) MG EC tablet, Take 1 capsule by mouth daily, Disp: , Rfl:     losartan (COZAAR) 25 mg tablet, Take 1 tablet (25 mg total) by mouth daily after dinner, Disp: 30 tablet, Rfl: 5    metoprolol succinate (TOPROL-XL) 100 mg 24 hr tablet, Take 1 tablet by mouth 2 (two) times a day, Disp: , Rfl:     sitaGLIPtin (JANUVIA) 50 mg tablet, Take 1 tablet by mouth daily, Disp: , Rfl:     torsemide (DEMADEX) 20 mg tablet, Take 1 tablet (20 mg total) by mouth 2 (two) times a day, Disp: 180 tablet, Rfl: 3    Laboratory Results:        Results for orders placed or performed in visit on 06/04/18   CBC and differential   Result Value Ref Range    External WBC 7 8     External RBC 4 09     External Hemoglobin 12 7 g/dL    External Hematocrit 38 %    External MCV 92     External RDW 16     External Platelets 993 K/µL    External Abs Neutrophils 5 6     External Abs Lymphocytes 1 3     External Abs Monocytes  0 4     External Abs Eosinophils 0 3     External Abs Basophils  0 1     Neutrophils % (Fluid) 72 %    Lymphocytes % 17 %    Monocytes % 6 4 - 12 %    Eosinophils % 4 0 - 6 %    Basophils % 1 0 - 1 %    External MCV 92     MCH 31 0 26 8 - 34 3 pg    MCHC 33 7 31 4 - 37 4 g/dL    MPV 8 6 (A) 8 9 - 12 7 fL   Comprehensive metabolic panel   Result Value Ref Range    EXTERNAL SODIUM 138     EXTERNAL POTASSIUM 3 7     EXTERNAL CHLORIDE 100     SL AMB CARBON DIOXIDE 28     EXTERNAL ANION GAP 10     EXTERNAL BUN 30     EXTERNAL CREATININE 1 45     EXT Glucose Bld 198     EXTERNAL CALCIUM 9 5     EXTERNAL TOTAL PROTEIN 7 7     EXTERNAL ALBUMIN 4 0     EXTERNAL AST 17     EXTERNAL ALT 28     EXTERNAL ALK PHOS 97     EXTERNAL TOT   BILIRUBIN 0 4     EXTERNAL EGFR 33     Albumin 4 0     Protein/Creatinine Ratio 0 50     Protein, Total Urine 40 2 mg/dL    Creatinine, Urine 80 7    PTH, intact   Result Value Ref Range    EXTERNAL PTH 53 8     Magnesium 2 0 1 6 - 2 6 mg/dL    Phosphorus 3 1 2 3 - 4 1 mg/dL

## 2018-08-07 ENCOUNTER — TELEPHONE (OUTPATIENT)
Dept: OTHER | Facility: HOSPITAL | Age: 83
End: 2018-08-07

## 2018-08-07 ENCOUNTER — OFFICE VISIT (OUTPATIENT)
Dept: NEPHROLOGY | Facility: CLINIC | Age: 83
End: 2018-08-07
Payer: MEDICARE

## 2018-08-07 VITALS
BODY MASS INDEX: 32.65 KG/M2 | HEIGHT: 65 IN | SYSTOLIC BLOOD PRESSURE: 155 MMHG | DIASTOLIC BLOOD PRESSURE: 83 MMHG | WEIGHT: 196 LBS

## 2018-08-07 DIAGNOSIS — D63.1 ANEMIA OF CHRONIC RENAL FAILURE, STAGE 3 (MODERATE) (HCC): ICD-10-CM

## 2018-08-07 DIAGNOSIS — E87.79 OTHER HYPERVOLEMIA: ICD-10-CM

## 2018-08-07 DIAGNOSIS — N18.30 BENIGN HYPERTENSION WITH CHRONIC KIDNEY DISEASE, STAGE III (HCC): ICD-10-CM

## 2018-08-07 DIAGNOSIS — N18.30 CHRONIC KIDNEY DISEASE, STAGE III (MODERATE) (HCC): Primary | ICD-10-CM

## 2018-08-07 DIAGNOSIS — N18.30 ANEMIA OF CHRONIC RENAL FAILURE, STAGE 3 (MODERATE) (HCC): ICD-10-CM

## 2018-08-07 DIAGNOSIS — R60.0 LOCALIZED EDEMA: ICD-10-CM

## 2018-08-07 DIAGNOSIS — I12.9 BENIGN HYPERTENSION WITH CHRONIC KIDNEY DISEASE, STAGE III (HCC): ICD-10-CM

## 2018-08-07 PROCEDURE — 99213 OFFICE O/P EST LOW 20 MIN: CPT | Performed by: NURSE PRACTITIONER

## 2018-08-07 NOTE — LETTER
August 7, 2018     [address] [facility] [place] [address]    Patient: Mauricio Daniels   YOB: 1931   Date of Visit: 8/7/2018       Dear Dr Derrick Goff:    It was a pleasure seeing Mauricio Daniels in the Nephrology Clinic  Below are my notes for this visit  If you have questions, please do not hesitate to call me  I look forward to following your patient along with you  Sincerely,        AR Guillory        CC: MD Navjot Blank, [address]  8/7/2018  3:35 PM  Sign at close encounter  Dakota Salas [age] y o  female MRN: [id number]  8/7/2018    Reason for Visit:  Follow-up    Interval history, subjective:    I had the pleasure of seeing Laith Solomon today in the renal clinic for the continued management of chronic kidney disease and hypertension  She was last seen by Dr Jaiden Arnett in June  During that visit amlodipine was decreased to 5 mg a day and torsemide was continued  She was also started on losartan at last office visit  Since our last visit, there has been no ER visits or hospitilizations  She is concerned about increasing weight particularly increasing abdominal girth  Lower extremity edema is present, chronic in nature  She tries to elevate her legs in the afternoon  She is currently only taking torsemide daily at the suggestion of her cardiologist Dr Deejay Valenzuela  When she was at Dr Didier Flores office she weighed 190 lb  She is currently 196 lb  Patient denies any chest pain, shortness of breath  The last blood work was done on 8/1/18, which we have reviewed together  ASSESSMENT and PLAN:    1  Chronic kidney disease stage 3:   · 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, arteriolar nephrosclerosis, stigmata of FELIPE  · Urine protein creatinine ratio 0 5 g   Baseline creatinine 1 4   · Creatinine 1 33 6/25/18, 1 49 8/1/18  2   Hypertension, volume status:  Blood pressure above goal-may be volume mediated  · Ongoing issues with edema  May be somewhat related to the use of amlodipine  · At last office visit amlodipine was decreased to 5 mg a day  She thinks her legs may be slightly less edematous but is not sure  · When she saw Dr Veronica Alvarez he felt that edema was primarily related to amlodipine, no evidence of CHF and he decreased torsemide to 20 mg a day  Since torsemide was decreased she reports increasing abdominal girth and weight gain  I obtained Dr Magaly Carvalho note from his office  He notes that if we feel that she needs an increased dose of torsemide that he is okay with that change  Also blood pressure is elevated and it may be partially volume mediated  · If we feel that she is becoming volume depleted we may elect to alternate b i d  dosing with daily dosing of torsemide  · Generally, asymptomatic orthostatic decline in blood pressure by approximately 13-20 points/low diastolic readings at times  Today systolic blood pressure only decline by 6 point upon standing  · Started on losartan 25 mg HS at last office visit  · Recently purchased new home device for monitoring blood pressure-systolic readings correlated fairly well but diastolic readings were significantly higher +3/+13 (it took 2 readings)  · Weight loss encouraged  · Low-salt diet stressed  · Fluid intake acceptable  She does not seem to drink excessively  · TSH normal  3  Electrolytes: stable  Last potassium 4 2  4  CKD MBD:   · Phosphorus 3 1 on 05/30/2018  · Follow up blood work before next appointment  5  Gout:  Uric acid 5 0  Other:  Diabetes mellitus, dyslipidemia, the prior AVR        PATIENT INSTRUCTIONS:    Patient Instructions   1  No change in medications  2  We will call you regarding medication changes  3  Low salt diet  4  No NSAIDS  5  Follow up with Dr Renata Solomon  6   Blood work before next appointment    OBJECTIVE:  Current Weight: Weight - Scale: 88 9 kg (196 lb)  Vitals:    08/07/18 0941 08/07/18 0911 08/07/18 4769 08/07/18 4369 BP: 158/70 154/76 148/68 155/83   BP Location: Right arm Left arm Left arm Left arm   Patient Position: Sitting Sitting Standing    Cuff Size: Large Large     Weight:       Height:        Body mass index is 32 62 kg/m²  REVIEW OF SYSTEMS:    Review of Systems   Constitutional: Positive for unexpected weight change  Negative for activity change, appetite change, chills, diaphoresis, fatigue and fever  HENT: Negative  Eyes: Negative  Respiratory: Negative  Cardiovascular: Positive for leg swelling  Negative for chest pain and palpitations  Gastrointestinal: Positive for abdominal distention  Negative for constipation, diarrhea, nausea and vomiting  Endocrine: Negative  Genitourinary: Negative  Musculoskeletal:        Neuropathy of feet   Skin: Negative  Neurological: Negative  Hematological: Negative  Psychiatric/Behavioral: Negative  PHYSICAL EXAM:      Physical Exam   Constitutional: She is oriented to person, place, and time  She appears well-developed and well-nourished  No distress  HENT:   Head: Normocephalic and atraumatic  Eyes: Conjunctivae are normal  Right eye exhibits no discharge  Left eye exhibits no discharge  No scleral icterus  Neck: Neck supple  No JVD present  Cardiovascular: Normal rate  An irregular rhythm present  Exam reveals no gallop  No murmur heard  Pulmonary/Chest: Effort normal and breath sounds normal  No respiratory distress  She has no wheezes  She has no rales  Abdominal: Soft  Bowel sounds are normal  She exhibits distension  There is no tenderness (Mild distension)  There is no rebound and no guarding  Musculoskeletal: She exhibits edema (One to 2+ lower extremity edema bilaterally  Edema below the knees to the feet bilaterally)  Neurological: She is alert and oriented to person, place, and time  Skin: Skin is warm and dry  No rash noted  She is not diaphoretic  No erythema  No pallor     Psychiatric: She has a normal mood and affect   Her behavior is normal  Judgment and thought content normal        Medications:    Current Outpatient Prescriptions:     allopurinol (ZYLOPRIM) 100 mg tablet, Take 100 mg by mouth daily, Disp: , Rfl:     allopurinol (ZYLOPRIM) 300 mg tablet, Take 1 tablet by mouth daily, Disp: , Rfl:     amLODIPine (NORVASC) 5 mg tablet, Take 1 tablet (5 mg total) by mouth daily in the early morning, Disp: 60 tablet, Rfl: 0    aspirin (ASPIRIN LOW DOSE) 81 MG tablet, Take 1 tablet by mouth daily, Disp: , Rfl:     cholecalciferol (VITAMIN D3) 1,000 units tablet, Take 1,000 Units by mouth, Disp: , Rfl:     clopidogrel (PLAVIX) 75 mg tablet, Take 75 mg by mouth daily, Disp: , Rfl:     docusate sodium (COLACE) 100 mg capsule, Take 1 capsule by mouth daily, Disp: , Rfl:     ferrous sulfate 325 (65 FE) MG EC tablet, Take 1 capsule by mouth daily, Disp: , Rfl:     losartan (COZAAR) 25 mg tablet, Take 1 tablet (25 mg total) by mouth daily after dinner, Disp: 30 tablet, Rfl: 5    metoprolol succinate (TOPROL-XL) 100 mg 24 hr tablet, Take 1 tablet by mouth 2 (two) times a day, Disp: , Rfl:     sitaGLIPtin (JANUVIA) 50 mg tablet, Take 1 tablet by mouth daily, Disp: , Rfl:     torsemide (DEMADEX) 20 mg tablet, Take 1 tablet (20 mg total) by mouth 2 (two) times a day, Disp: 180 tablet, Rfl: 3    Laboratory Results:        Results for orders placed or performed in visit on 06/04/18   CBC and differential   Result Value Ref Range    External WBC 7 8     External RBC 4 09     External Hemoglobin 12 7 g/dL    External Hematocrit 38 %    External MCV 92     External RDW 16     External Platelets 499 K/µL    External Abs Neutrophils 5 6     External Abs Lymphocytes 1 3     External Abs Monocytes  0 4     External Abs Eosinophils 0 3     External Abs Basophils  0 1     Neutrophils % (Fluid) 72 %    Lymphocytes % 17 %    Monocytes % 6 4 - 12 %    Eosinophils % 4 0 - 6 %    Basophils % 1 0 - 1 %    External MCV 92     MCH 31 0 26 8 - 34 3 pg    MCHC 33 7 31 4 - 37 4 g/dL    MPV 8 6 (A) 8 9 - 12 7 fL   Comprehensive metabolic panel   Result Value Ref Range    EXTERNAL SODIUM 138     EXTERNAL POTASSIUM 3 7     EXTERNAL CHLORIDE 100     SL AMB CARBON DIOXIDE 28     EXTERNAL ANION GAP 10     EXTERNAL BUN 30     EXTERNAL CREATININE 1 45     EXT Glucose Bld 198     EXTERNAL CALCIUM 9 5     EXTERNAL TOTAL PROTEIN 7 7     EXTERNAL ALBUMIN 4 0     EXTERNAL AST 17     EXTERNAL ALT 28     EXTERNAL ALK PHOS 97     EXTERNAL TOT   BILIRUBIN 0 4     EXTERNAL EGFR 33     Albumin 4 0     Protein/Creatinine Ratio 0 50     Protein, Total Urine 40 2 mg/dL    Creatinine, Urine 80 7    PTH, intact   Result Value Ref Range    EXTERNAL PTH 53 8     Magnesium 2 0 1 6 - 2 6 mg/dL    Phosphorus 3 1 2 3 - 4 1 mg/dL

## 2018-08-07 NOTE — PATIENT INSTRUCTIONS
1   Increase torsemide to twice a day  20 mg twice a day  2  We will call you regarding medication changes  3  Low salt diet  4  No NSAIDS  5  Follow up with Dr Andria Weathers  6   Blood work before next appointment

## 2018-08-09 LAB
EXT GLUCOSE BLD: 183
EXTERNAL ALBUMIN: 3.9
EXTERNAL ALK PHOS: 94
EXTERNAL ALT: 28
EXTERNAL AST: 15
EXTERNAL BICARBONATE: 27
EXTERNAL BUN: 31
EXTERNAL CALCIUM: 9.3
EXTERNAL CHLORIDE: 103
EXTERNAL CREATININE: 1.49
EXTERNAL EGFR: 32
EXTERNAL POTASSIUM: 4.2
EXTERNAL SODIUM: 139
HCT VFR BLD AUTO: 36.9 % (ref 34.8–46.1)
HGB BLD-MCNC: 12.5 G/DL (ref 11.5–15.4)
PLATELET # BLD AUTO: 188 THOUSANDS/UL (ref 149–390)
URATE SERPL-MCNC: 5 MG/DL (ref 2–6.8)
WBC # BLD AUTO: 7.6 THOUSAND/UL

## 2018-09-26 DIAGNOSIS — I12.9 PARENCHYMAL RENAL HYPERTENSION, STAGE 1 THROUGH STAGE 4 OR UNSPECIFIED CHRONIC KIDNEY DISEASE: ICD-10-CM

## 2018-09-26 RX ORDER — LOSARTAN POTASSIUM 25 MG/1
25 TABLET ORAL
Qty: 90 TABLET | Refills: 3 | Status: SHIPPED | OUTPATIENT
Start: 2018-09-26 | End: 2018-10-25 | Stop reason: SDUPTHER

## 2018-10-25 ENCOUNTER — OFFICE VISIT (OUTPATIENT)
Dept: NEPHROLOGY | Facility: CLINIC | Age: 83
End: 2018-10-25
Payer: MEDICARE

## 2018-10-25 VITALS
DIASTOLIC BLOOD PRESSURE: 63 MMHG | WEIGHT: 196 LBS | BODY MASS INDEX: 32.65 KG/M2 | SYSTOLIC BLOOD PRESSURE: 155 MMHG | HEIGHT: 65 IN | HEART RATE: 70 BPM

## 2018-10-25 DIAGNOSIS — E78.5 DYSLIPIDEMIA: ICD-10-CM

## 2018-10-25 DIAGNOSIS — R60.0 LOCALIZED EDEMA: ICD-10-CM

## 2018-10-25 DIAGNOSIS — I12.9 BENIGN HYPERTENSION WITH CHRONIC KIDNEY DISEASE, STAGE III (HCC): Primary | ICD-10-CM

## 2018-10-25 DIAGNOSIS — N18.30 ANEMIA OF CHRONIC RENAL FAILURE, STAGE 3 (MODERATE) (HCC): ICD-10-CM

## 2018-10-25 DIAGNOSIS — N18.30 CHRONIC KIDNEY DISEASE, STAGE III (MODERATE) (HCC): ICD-10-CM

## 2018-10-25 DIAGNOSIS — N18.30 BENIGN HYPERTENSION WITH CHRONIC KIDNEY DISEASE, STAGE III (HCC): Primary | ICD-10-CM

## 2018-10-25 DIAGNOSIS — D63.1 ANEMIA OF CHRONIC RENAL FAILURE, STAGE 3 (MODERATE) (HCC): ICD-10-CM

## 2018-10-25 DIAGNOSIS — I12.9 PARENCHYMAL RENAL HYPERTENSION, STAGE 1 THROUGH STAGE 4 OR UNSPECIFIED CHRONIC KIDNEY DISEASE: ICD-10-CM

## 2018-10-25 DIAGNOSIS — I12.9 NEPHROSCLEROSIS, STAGE 1 THROUGH STAGE 4 OR UNSPECIFIED CHRONIC KIDNEY DISEASE: ICD-10-CM

## 2018-10-25 PROCEDURE — 99214 OFFICE O/P EST MOD 30 MIN: CPT | Performed by: INTERNAL MEDICINE

## 2018-10-25 RX ORDER — TORSEMIDE 20 MG/1
20 TABLET ORAL DAILY
Qty: 180 TABLET | Refills: 0 | Status: SHIPPED | OUTPATIENT
Start: 2018-10-25 | End: 2018-10-25 | Stop reason: SDUPTHER

## 2018-10-25 RX ORDER — TORSEMIDE 20 MG/1
20 TABLET ORAL DAILY
Qty: 235 TABLET | Refills: 3 | Status: SHIPPED | OUTPATIENT
Start: 2018-10-25 | End: 2020-07-08 | Stop reason: SDUPTHER

## 2018-10-25 RX ORDER — ERGOCALCIFEROL 1.25 MG/1
50000 CAPSULE ORAL WEEKLY
Qty: 12 CAPSULE | Refills: 0 | Status: SHIPPED | OUTPATIENT
Start: 2018-10-25 | End: 2019-07-22 | Stop reason: SDUPTHER

## 2018-10-25 RX ORDER — LOSARTAN POTASSIUM 25 MG/1
25 TABLET ORAL
Qty: 90 TABLET | Refills: 0 | Status: SHIPPED | OUTPATIENT
Start: 2018-10-25 | End: 2019-04-18 | Stop reason: SDUPTHER

## 2018-10-25 NOTE — LETTER
October 25, 2018     Brandon White, [address] [facility]  [place] [address]    Patient: Loren Bennett   YOB: 1931   Date of Visit: 10/25/2018       Dear Dr Theo Medellin:    Thank you for referring Loren Bennett to me for evaluation  Below are my notes for this consultation  If you have questions, please do not hesitate to call me  I look forward to following your patient along with you  Sincerely,        Joel Maldonado MD        CC: MD Vita Goncalves MD Dru Gowda, MD  10/25/2018  9:32 AM  Sign at close encounter  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  · Baseline creatinine:  1 4  · Current creatinine:  1 44 at baseline  · Urine protein creatinine ratio:  0 33 g at goal  Recommendations:  · Treat hypertension-please see below  · Treat dyslipidemia-please see below  · Maintain proteinuria less than 1 g or as low as possible  · Avoid nephrotoxic agents such as NSAIDs, patient counseled as such  2   Volume:  Chronic edema and part related to amlodipine; amlodipine had been decreased secondary to the edema  -TSH was normal  -venous duplex was normal  3  Hypertension:  Chronic systolic hypertension with low diastolic readings and mild orthostasis in the past       Current blood pressure averages:  AM:  175/98, standing 154/92  PM:  161/92, standing 148/91    · Goal blood pressure:  Less than 130/80 given CAD  Recommendations:  ·  I am uncertain of the accuracy of her blood pressure monitor as it was significantly off last evaluation  · Her blood pressure however does appear to be elevated as outlined above and by my office readings  · Would increase losartan to 25 mg in the evening  · Take an extra 10 mg of torsemide Mondays Wednesdays and Fridays  4  Electrolytes: All acceptable  5    Mineral bone disorder:  · Calcium/magnesium/phosphorus:  All acceptable  · PTH intact:  Normal  · Vitamin-D:  26 REPLETE with ergo calciferol plus increasing vitamin-D per primary medical physician to 2000 units is excellent  6  Dyslipidemia:  Typically handled by Cardiology  7  Anemia:  NORMAL AT A HEMOGLOBIN OF 12  8  Other problems:  · Status post MI with cardiac stents/ Status post aortic valve replacement followed by Cardiology Dr Agusto Weeks  · Diabetes mellitus per primary medical physician  · 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          PATIENT INSTRUCTIONS:    Patient Instructions   1  New medication changes:  -take an additional 1/2 tablet of torsemide Mondays Wednesdays and Fridays only, this would be 30 mg of torsemide on Mondays Wednesdays and Fridays; every other day as take 1 tablet of torsemide or 20 mg  -take ergo calciferol 06060 units weekly for 12 weeks in addition to irregular vitamin-D  -take a full losartan/Cozaar or 25 mg at bedtime  2  Please go for lab work October 31st nonfasting with year rheumatologists labs  3  Please wait 2-3 weeks and then take another week a blood pressure readings morning and evening, sitting and standing  The morning readings before taking any medications  The evening readings are closer to bedtime  When standing keep your arm supported at heart level and not dangling  Please bring in the readings along with your new blood pressure machine to see 1 of my advanced practitioner's in about 3-4 weeks  4  Follow-up in 4 months:  -please go for fasting lab work prior to your appointment  -please bring in 1 week a blood pressure readings morning and evening, sitting and standing is outlined above  5  General instructions:  -avoid salt  -avoid medications such as Motrin, Naprosyn, ibuprofen, Aleve or Advil or Celebrex as they can affect your kidney function; you can use Tylenol as needed for pain or fevers if you have no liver problems  -avoid medications such as Sudafed or other medications with decongestants as they can raise your blood pressure  -try to exercise at least 30 minutes at least 3 days a week such as walking  -try to lose 5-10 lb by your next visit  -keep your legs elevated when seated  Please call if your leg swelling gets any worse          Subjective: The patient overall is feeling well  No fevers chills cough or colds  Good appetite and good energy  No urinary symptoms including foamy urine or blood in the urine  No gastrointestinal symptoms  No cardiovascular symptoms including swelling of the legs  No headaches, dizziness or lightheadedness  Blood pressure medications:  -torsemide 20 mg once a day  -Toprol  mg twice a day  -losartan 12 5 mg in the evening  -amlodipine 5 mg daily in the morning    ROS:  See HPI, otherwise review of systems as completely reviewed with the patient are negative    Past Medical History:   Diagnosis Date    Diabetes mellitus ([facility] [address] )     Hypertension      Past Surgical History:   Procedure Laterality Date    VALVE REPLACEMENT       Family History   Problem Relation Age of Onset    Heart disease Mother     Cancer Father       reports that she has never smoked  She has never used smokeless tobacco  She reports that she does not drink alcohol or use drugs  I COMPLETELY REVIEWED THE PAST MEDICAL HISTORY/PAST SURGICAL HISTORY/SOCIAL HISTORY/FAMILY HISTORY/AND MEDICATIONS  AND UPDATED ALL    Objective:     Vitals:   Vitals:    10/25/18 0833   BP: 155/63   Pulse: 70    BP sitting on left:  170/68 with a heart rate of 72 and regular  BP standing on left:  168/76 with a heart rate of 72 and regular    Weight (last 2 days)     Date/Time   Weight    10/25/18 0833  88 9 (196)            Wt Readings from Last 3 Encounters:   10/25/18 88 9 kg (196 lb)   08/07/18 88 9 kg (196 lb)   06/18/18 88 kg (194 lb)     Body mass index is 32 62 kg/m²      Physical Exam: General:  Obese, but in No acute distress  Skin:  No acute rash  Eyes:  No scleral icterus, noninjected  ENT:  Moist mucous membranes  Neck:  Supple, no jugular venous distention  Back   No CVAT  Chest:  Clear to auscultation and percussion  CVS:  Regular rate and rhythm without a rub, murmurs or gallops  Abdomen:  Obese,Soft and nontender with normal bowel sounds  Extremities:  No cyanosis and chronic lower extremity edema; almost like lymphedema with not much pitting  Neuro:  Grossly intact  Psych:  Alert, oriented x3 and appropriate      Medications:    Current Outpatient Prescriptions:     allopurinol (ZYLOPRIM) 100 mg tablet, Take 200 mg by mouth daily  , Disp: , Rfl:     allopurinol (ZYLOPRIM) 300 mg tablet, Take 1 tablet by mouth daily, Disp: , Rfl:     amLODIPine (NORVASC) 5 mg tablet, Take 1 tablet (5 mg total) by mouth daily in the early morning, Disp: 60 tablet, Rfl: 0    aspirin (ASPIRIN LOW DOSE) 81 MG tablet, Take 1 tablet by mouth daily, Disp: , Rfl:     cholecalciferol (VITAMIN D3) 1,000 units tablet, Take 1,000 Units by mouth, Disp: , Rfl:     clopidogrel (PLAVIX) 75 mg tablet, Take 75 mg by mouth daily, Disp: , Rfl:     docusate sodium (COLACE) 100 mg capsule, Take 1 capsule by mouth daily, Disp: , Rfl:     ferrous sulfate 325 (65 FE) MG EC tablet, Take 1 capsule by mouth daily, Disp: , Rfl:     losartan (COZAAR) 25 mg tablet, Take 1 tablet (25 mg total) by mouth daily after dinner, Disp: 90 tablet, Rfl: 0    metoprolol succinate (TOPROL-XL) 100 mg 24 hr tablet, Take 1 tablet by mouth 2 (two) times a day, Disp: , Rfl:     sitaGLIPtin (JANUVIA) 50 mg tablet, Take 1 tablet by mouth daily, Disp: , Rfl:     torsemide (DEMADEX) 20 mg tablet, Take 1 tablet (20 mg total) by mouth daily Take 20 mg daily except for Monday Wednesday and Friday take 1-1/2 tablets equivalent to 30 mg, Disp: 235 tablet, Rfl: 3    ergocalciferol (VITAMIN D2) 50,000 units, Take 1 capsule (50,000 Units total) by mouth once a week for 12 doses, Disp: 12 capsule, Rfl: 0    Lab, Imaging and other studies: I have personally reviewed pertinent labs  Laboratory Results:  Results for orders placed or performed in visit on 08/09/18   Comprehensive metabolic panel   Result Value Ref Range    SODIUM 139     POTASSIUM 4 2     CHLORIDE 103     EXTERNAL BICARB 27     BUN 31     CREATININE 1 49     Glucose 183     EXTERNAL CALCIUM 9 3     EXTERNAL ALBUMIN 3 9     AST 15     ALT 28     ALK PHOS 94     EXTERNAL EGFR 32     Uric Acid 5 0 2 0 - 6 8 mg/dL    WBC 7 60 Thousand/uL    Hemoglobin 12 5 11 5 - 15 4 g/dL    Hematocrit 36 9 34 8 - 46 1 %    Platelets 251 373 - 506 Thousands/uL               Radiology review:   chest X-ray    Ultrasound      Portions of the record may have been created with voice recognition software   Occasional wrong word or "sound a like" substitutions may have occurred due to the inherent limitations of voice recognition software   Read the chart carefully and recognize, using context, where substitutions have occurred

## 2018-10-25 NOTE — PATIENT INSTRUCTIONS
1   New medication changes:  -take an additional 1/2 tablet of torsemide Mondays Wednesdays and Fridays only, this would be 30 mg of torsemide on Mondays Wednesdays and Fridays; every other day as take 1 tablet of torsemide or 20 mg  -take ergo calciferol 96065 units weekly for 12 weeks in addition to irregular vitamin-D  -take a full losartan/Cozaar or 25 mg at bedtime  2  Please go for lab work October 31st nonfasting with year rheumatologists labs  3  Please wait 2-3 weeks and then take another week a blood pressure readings morning and evening, sitting and standing  The morning readings before taking any medications  The evening readings are closer to bedtime  When standing keep your arm supported at heart level and not dangling  Please bring in the readings along with your new blood pressure machine to see 1 of my advanced practitioner's in about 3-4 weeks  4  Follow-up in 4 months:  -please go for fasting lab work prior to your appointment  -please bring in 1 week a blood pressure readings morning and evening, sitting and standing is outlined above  5  General instructions:  -avoid salt  -avoid medications such as Motrin, Naprosyn, ibuprofen, Aleve or Advil or Celebrex as they can affect your kidney function; you can use Tylenol as needed for pain or fevers if you have no liver problems  -avoid medications such as Sudafed or other medications with decongestants as they can raise your blood pressure  -try to exercise at least 30 minutes at least 3 days a week such as walking  -try to lose 5-10 lb by your next visit  -keep your legs elevated when seated  Please call if your leg swelling gets any worse

## 2018-10-25 NOTE — PROGRESS NOTES
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  · Baseline creatinine:  1 4  · Current creatinine:  1 44 at baseline  · Urine protein creatinine ratio:  0 33 g at goal  Recommendations:  · Treat hypertension-please see below  · Treat dyslipidemia-please see below  · Maintain proteinuria less than 1 g or as low as possible  · Avoid nephrotoxic agents such as NSAIDs, patient counseled as such  2   Volume:  Chronic edema and part related to amlodipine; amlodipine had been decreased secondary to the edema  -TSH was normal  -venous duplex was normal  3  Hypertension:  Chronic systolic hypertension with low diastolic readings and mild orthostasis in the past       Current blood pressure averages:  AM:  175/98, standing 154/92  PM:  161/92, standing 148/91    · Goal blood pressure:  Less than 130/80 given CAD  Recommendations:  ·  I am uncertain of the accuracy of her blood pressure monitor as it was significantly off last evaluation  · Her blood pressure however does appear to be elevated as outlined above and by my office readings  · Would increase losartan to 25 mg in the evening  · Take an extra 10 mg of torsemide Mondays Wednesdays and Fridays  4  Electrolytes: All acceptable  5  Mineral bone disorder:  · Calcium/magnesium/phosphorus:  All acceptable  · PTH intact:  Normal  · Vitamin-D:  26 REPLETE with ergo calciferol plus increasing vitamin-D per primary medical physician to 2000 units is excellent  6  Dyslipidemia:  Typically handled by Cardiology  7  Anemia:  NORMAL AT A HEMOGLOBIN OF 12  8  Other problems:  · Status post MI with cardiac stents/ Status post aortic valve replacement followed by Cardiology Dr Marcia Locke  · Diabetes mellitus per primary medical physician  · 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          PATIENT INSTRUCTIONS:    Patient Instructions   1    New medication changes:  -take an additional 1/2 tablet of torsemide Mondays Wednesdays and Fridays only, this would be 30 mg of torsemide on Mondays Wednesdays and Fridays; every other day as take 1 tablet of torsemide or 20 mg  -take ergo calciferol 47149 units weekly for 12 weeks in addition to irregular vitamin-D  -take a full losartan/Cozaar or 25 mg at bedtime  2  Please go for lab work October 31st nonfasting with year rheumatologists labs  3  Please wait 2-3 weeks and then take another week a blood pressure readings morning and evening, sitting and standing  The morning readings before taking any medications  The evening readings are closer to bedtime  When standing keep your arm supported at heart level and not dangling  Please bring in the readings along with your new blood pressure machine to see 1 of my advanced practitioner's in about 3-4 weeks  4  Follow-up in 4 months:  -please go for fasting lab work prior to your appointment  -please bring in 1 week a blood pressure readings morning and evening, sitting and standing is outlined above  5  General instructions:  -avoid salt  -avoid medications such as Motrin, Naprosyn, ibuprofen, Aleve or Advil or Celebrex as they can affect your kidney function; you can use Tylenol as needed for pain or fevers if you have no liver problems  -avoid medications such as Sudafed or other medications with decongestants as they can raise your blood pressure  -try to exercise at least 30 minutes at least 3 days a week such as walking  -try to lose 5-10 lb by your next visit  -keep your legs elevated when seated  Please call if your leg swelling gets any worse          Subjective: The patient overall is feeling well  No fevers chills cough or colds    Good appetite and good energy  No urinary symptoms including foamy urine or blood in the urine  No gastrointestinal symptoms  No cardiovascular symptoms including swelling of the legs  No headaches, dizziness or lightheadedness  Blood pressure medications:  -torsemide 20 mg once a day  -Toprol  mg twice a day  -losartan 12 5 mg in the evening  -amlodipine 5 mg daily in the morning    ROS:  See HPI, otherwise review of systems as completely reviewed with the patient are negative    Past Medical History:   Diagnosis Date    Diabetes mellitus ([address] )     Hypertension      Past Surgical History:   Procedure Laterality Date    VALVE REPLACEMENT       Family History   Problem Relation Age of Onset    Heart disease Mother     Cancer Father       reports that she has never smoked  She has never used smokeless tobacco  She reports that she does not drink alcohol or use drugs  I COMPLETELY REVIEWED THE PAST MEDICAL HISTORY/PAST SURGICAL HISTORY/SOCIAL HISTORY/FAMILY HISTORY/AND MEDICATIONS  AND UPDATED ALL    Objective:     Vitals:   Vitals:    10/25/18 0833   BP: 155/63   Pulse: 70    BP sitting on left:  170/68 with a heart rate of 72 and regular  BP standing on left:  168/76 with a heart rate of 72 and regular    Weight (last 2 days)     Date/Time   Weight    10/25/18 0833  88 9 (196)            Wt Readings from Last 3 Encounters:   10/25/18 88 9 kg (196 lb)   08/07/18 88 9 kg (196 lb)   06/18/18 88 kg (194 lb)     Body mass index is 32 62 kg/m²      Physical Exam: General:  Obese, but in No acute distress  Skin:  No acute rash  Eyes:  No scleral icterus, noninjected  ENT:  Moist mucous membranes  Neck:  Supple, no jugular venous distention  Back   No CVAT  Chest:  Clear to auscultation and percussion  CVS:  Regular rate and rhythm without a rub, murmurs or gallops  Abdomen:  Obese,Soft and nontender with normal bowel sounds  Extremities:  No cyanosis and chronic lower extremity edema; almost like lymphedema with not much pitting  Neuro:  Grossly intact  Psych:  Alert, oriented x3 and appropriate      Medications:    Current Outpatient Prescriptions:     allopurinol (ZYLOPRIM) 100 mg tablet, Take 200 mg by mouth daily  , Disp: , Rfl:    allopurinol (ZYLOPRIM) 300 mg tablet, Take 1 tablet by mouth daily, Disp: , Rfl:     amLODIPine (NORVASC) 5 mg tablet, Take 1 tablet (5 mg total) by mouth daily in the early morning, Disp: 60 tablet, Rfl: 0    aspirin (ASPIRIN LOW DOSE) 81 MG tablet, Take 1 tablet by mouth daily, Disp: , Rfl:     cholecalciferol (VITAMIN D3) 1,000 units tablet, Take 1,000 Units by mouth, Disp: , Rfl:     clopidogrel (PLAVIX) 75 mg tablet, Take 75 mg by mouth daily, Disp: , Rfl:     docusate sodium (COLACE) 100 mg capsule, Take 1 capsule by mouth daily, Disp: , Rfl:     ferrous sulfate 325 (65 FE) MG EC tablet, Take 1 capsule by mouth daily, Disp: , Rfl:     losartan (COZAAR) 25 mg tablet, Take 1 tablet (25 mg total) by mouth daily after dinner, Disp: 90 tablet, Rfl: 0    metoprolol succinate (TOPROL-XL) 100 mg 24 hr tablet, Take 1 tablet by mouth 2 (two) times a day, Disp: , Rfl:     sitaGLIPtin (JANUVIA) 50 mg tablet, Take 1 tablet by mouth daily, Disp: , Rfl:     torsemide (DEMADEX) 20 mg tablet, Take 1 tablet (20 mg total) by mouth daily Take 20 mg daily except for Monday Wednesday and Friday take 1-1/2 tablets equivalent to 30 mg, Disp: 235 tablet, Rfl: 3    ergocalciferol (VITAMIN D2) 50,000 units, Take 1 capsule (50,000 Units total) by mouth once a week for 12 doses, Disp: 12 capsule, Rfl: 0    Lab, Imaging and other studies: I have personally reviewed pertinent labs    Laboratory Results:  Results for orders placed or performed in visit on 08/09/18   Comprehensive metabolic panel   Result Value Ref Range    SODIUM 139     POTASSIUM 4 2     CHLORIDE 103     EXTERNAL BICARB 27     BUN 31     CREATININE 1 49     Glucose 183     EXTERNAL CALCIUM 9 3     EXTERNAL ALBUMIN 3 9     AST 15     ALT 28     ALK PHOS 94     EXTERNAL EGFR 32     Uric Acid 5 0 2 0 - 6 8 mg/dL    WBC 7 60 Thousand/uL    Hemoglobin 12 5 11 5 - 15 4 g/dL    Hematocrit 36 9 34 8 - 46 1 %    Platelets 519 081 - 835 Thousands/uL               Radiology review:   chest X-ray    Ultrasound      Portions of the record may have been created with voice recognition software   Occasional wrong word or "sound a like" substitutions may have occurred due to the inherent limitations of voice recognition software   Read the chart carefully and recognize, using context, where substitutions have occurred

## 2018-11-02 ENCOUNTER — TELEPHONE (OUTPATIENT)
Dept: NEPHROLOGY | Facility: CLINIC | Age: 83
End: 2018-11-02

## 2018-11-02 DIAGNOSIS — N18.30 CKD (CHRONIC KIDNEY DISEASE) STAGE 3, GFR 30-59 ML/MIN (HCC): Primary | ICD-10-CM

## 2018-11-02 NOTE — TELEPHONE ENCOUNTER
----- Message from Orquidea Mendieta MD sent at 11/2/2018 10:29 AM EDT -----  PATIENT'S CALCIUM BORDERLINE HIGH  RECOMMENDATIONS:  1  STOP CALCIUM SUPPLEMENTATION IF SHE IS ON ANY  2   HOLD VITAMIN-D DAILY DOSE  3  REPEAT A BASIC METABOLIC PROFILE ALONG WITH AN IONIZED CALCIUM IN 2 WEEKS

## 2018-11-02 NOTE — TELEPHONE ENCOUNTER
----- Message from Julius Barraza MD sent at 11/2/2018 10:29 AM EDT -----  PATIENT'S CALCIUM BORDERLINE HIGH  RECOMMENDATIONS:  1  STOP CALCIUM SUPPLEMENTATION IF SHE IS ON ANY  2   HOLD VITAMIN-D DAILY DOSE  3  REPEAT A BASIC METABOLIC PROFILE ALONG WITH AN IONIZED CALCIUM IN 2 WEEKS

## 2018-11-02 NOTE — TELEPHONE ENCOUNTER
Called to speak with Jaclyn Dunaway and she did not answer  There was no voicemail available to leave a message  I will try to contact her again at a later time

## 2018-11-05 NOTE — TELEPHONE ENCOUNTER
I spoke with the patients daughter, she will have her stop the daily VitaminD  She is not currently taking any calcium supplements  They will repeat blood work in 2 weeks

## 2018-11-23 ENCOUNTER — TELEPHONE (OUTPATIENT)
Dept: NEPHROLOGY | Facility: CLINIC | Age: 83
End: 2018-11-23

## 2018-11-23 DIAGNOSIS — N18.30 CKD (CHRONIC KIDNEY DISEASE) STAGE 3, GFR 30-59 ML/MIN (HCC): Primary | ICD-10-CM

## 2018-11-23 NOTE — TELEPHONE ENCOUNTER
----- Message from Viola Schulz MD sent at 11/20/2018 11:46 AM EST -----  Please repeat a bmp in 1 week nonfasting for stability given increasing creatinine

## 2018-12-07 LAB
EXT GLUCOSE BLD: 177
EXTERNAL BUN: 38
EXTERNAL CALCIUM: 9.6
EXTERNAL CHLORIDE: 104
EXTERNAL CO2: 25
EXTERNAL CREATININE: 1.61
EXTERNAL EGFR: 29
EXTERNAL POTASSIUM: 4.1
EXTERNAL SODIUM: 138

## 2019-01-08 ENCOUNTER — TELEPHONE (OUTPATIENT)
Dept: NEPHROLOGY | Facility: CLINIC | Age: 84
End: 2019-01-08

## 2019-01-08 DIAGNOSIS — E78.5 DYSLIPIDEMIA: ICD-10-CM

## 2019-01-08 DIAGNOSIS — N18.30 CKD (CHRONIC KIDNEY DISEASE), STAGE III (HCC): Primary | ICD-10-CM

## 2019-01-08 DIAGNOSIS — R60.0 LOCALIZED EDEMA: ICD-10-CM

## 2019-01-08 DIAGNOSIS — I10 HYPERTENSION, UNSPECIFIED TYPE: ICD-10-CM

## 2019-01-08 LAB
ALBUMIN SNV-MCNC: 4 G/DL
ALP SERPL-CCNC: 85 U/L (ref 46–116)
EXT GLUCOSE BLD: 162
EXTERNAL ALT: 22
EXTERNAL AST: 16
EXTERNAL BUN: 40
EXTERNAL CALCIUM: 9.3
EXTERNAL CHLORIDE: 103
EXTERNAL CO2: 24
EXTERNAL CREATININE: 1.67
EXTERNAL EGFR: 27
EXTERNAL POTASSIUM: 4
EXTERNAL SODIUM: 137
HCT VFR BLD AUTO: 37 % (ref 34.8–46.1)
HGB BLD-MCNC: 12.5 G/DL (ref 11.5–15.4)
PLATELET # BLD AUTO: 213 THOUSANDS/UL (ref 149–390)
URATE SERPL-MCNC: 6 MG/DL (ref 2–6.8)
WBC # BLD AUTO: 9.3 THOUSAND/UL

## 2019-01-08 NOTE — TELEPHONE ENCOUNTER
----- Message from Julius Barraza MD sent at 1/8/2019  9:33 AM EST -----  Patient's creatinine only minimally higher than it has been at 1 67    Recommendations:  -repeat a basic metabolic profile nonfasting in 1-2 weeks

## 2019-01-08 NOTE — TELEPHONE ENCOUNTER
I spoke to patient and she is aware that Cr is a little high at 1 67 will repeat BMP in 2 weeks  Mailed out

## 2019-01-15 DIAGNOSIS — E78.5 DYSLIPIDEMIA: ICD-10-CM

## 2019-01-15 DIAGNOSIS — D63.1 ANEMIA OF CHRONIC RENAL FAILURE, STAGE 3 (MODERATE) (HCC): ICD-10-CM

## 2019-01-15 DIAGNOSIS — N18.30 BENIGN HYPERTENSION WITH CHRONIC KIDNEY DISEASE, STAGE III (HCC): ICD-10-CM

## 2019-01-15 DIAGNOSIS — I12.9 PARENCHYMAL RENAL HYPERTENSION, STAGE 1 THROUGH STAGE 4 OR UNSPECIFIED CHRONIC KIDNEY DISEASE: ICD-10-CM

## 2019-01-15 DIAGNOSIS — N18.30 ANEMIA OF CHRONIC RENAL FAILURE, STAGE 3 (MODERATE) (HCC): ICD-10-CM

## 2019-01-15 DIAGNOSIS — R60.0 LOCALIZED EDEMA: ICD-10-CM

## 2019-01-15 DIAGNOSIS — N18.30 CHRONIC KIDNEY DISEASE, STAGE III (MODERATE) (HCC): ICD-10-CM

## 2019-01-15 DIAGNOSIS — I12.9 BENIGN HYPERTENSION WITH CHRONIC KIDNEY DISEASE, STAGE III (HCC): ICD-10-CM

## 2019-01-15 DIAGNOSIS — I12.9 NEPHROSCLEROSIS, STAGE 1 THROUGH STAGE 4 OR UNSPECIFIED CHRONIC KIDNEY DISEASE: ICD-10-CM

## 2019-01-15 RX ORDER — ERGOCALCIFEROL 1.25 MG/1
50000 CAPSULE ORAL WEEKLY
Qty: 12 CAPSULE | Refills: 0 | OUTPATIENT
Start: 2019-01-15 | End: 2019-04-03

## 2019-01-19 DIAGNOSIS — I12.9 PARENCHYMAL RENAL HYPERTENSION, STAGE 1 THROUGH STAGE 4 OR UNSPECIFIED CHRONIC KIDNEY DISEASE: ICD-10-CM

## 2019-01-19 DIAGNOSIS — I12.9 BENIGN HYPERTENSION WITH CHRONIC KIDNEY DISEASE, STAGE III (HCC): ICD-10-CM

## 2019-01-19 DIAGNOSIS — R60.0 LOCALIZED EDEMA: ICD-10-CM

## 2019-01-19 DIAGNOSIS — E78.5 DYSLIPIDEMIA: ICD-10-CM

## 2019-01-19 DIAGNOSIS — I12.9 NEPHROSCLEROSIS, STAGE 1 THROUGH STAGE 4 OR UNSPECIFIED CHRONIC KIDNEY DISEASE: ICD-10-CM

## 2019-01-19 DIAGNOSIS — N18.30 CHRONIC KIDNEY DISEASE, STAGE III (MODERATE) (HCC): ICD-10-CM

## 2019-01-19 DIAGNOSIS — N18.30 ANEMIA OF CHRONIC RENAL FAILURE, STAGE 3 (MODERATE) (HCC): ICD-10-CM

## 2019-01-19 DIAGNOSIS — N18.30 BENIGN HYPERTENSION WITH CHRONIC KIDNEY DISEASE, STAGE III (HCC): ICD-10-CM

## 2019-01-19 DIAGNOSIS — D63.1 ANEMIA OF CHRONIC RENAL FAILURE, STAGE 3 (MODERATE) (HCC): ICD-10-CM

## 2019-01-21 RX ORDER — ERGOCALCIFEROL 1.25 MG/1
50000 CAPSULE ORAL WEEKLY
Qty: 12 CAPSULE | Refills: 0 | OUTPATIENT
Start: 2019-01-21 | End: 2019-04-09

## 2019-03-12 LAB
CREAT ?TM UR-SCNC: 83.1 UMOL/L
EXT PROTEIN URINE: 16.7
PROT/CREAT UR: 0.2 MG/G{CREAT}

## 2019-03-18 ENCOUNTER — OFFICE VISIT (OUTPATIENT)
Dept: NEPHROLOGY | Facility: CLINIC | Age: 84
End: 2019-03-18
Payer: MEDICARE

## 2019-03-18 VITALS
HEART RATE: 67 BPM | DIASTOLIC BLOOD PRESSURE: 67 MMHG | SYSTOLIC BLOOD PRESSURE: 151 MMHG | HEIGHT: 65 IN | BODY MASS INDEX: 32.32 KG/M2 | WEIGHT: 194 LBS

## 2019-03-18 DIAGNOSIS — E78.5 DYSLIPIDEMIA: ICD-10-CM

## 2019-03-18 DIAGNOSIS — N18.30 BENIGN HYPERTENSION WITH CHRONIC KIDNEY DISEASE, STAGE III (HCC): Primary | ICD-10-CM

## 2019-03-18 DIAGNOSIS — R60.0 LOCALIZED EDEMA: ICD-10-CM

## 2019-03-18 DIAGNOSIS — D63.1 ANEMIA OF CHRONIC RENAL FAILURE, STAGE 3 (MODERATE) (HCC): ICD-10-CM

## 2019-03-18 DIAGNOSIS — N18.30 CHRONIC KIDNEY DISEASE, STAGE III (MODERATE) (HCC): ICD-10-CM

## 2019-03-18 DIAGNOSIS — I12.9 BENIGN HYPERTENSION WITH CHRONIC KIDNEY DISEASE, STAGE III (HCC): Primary | ICD-10-CM

## 2019-03-18 DIAGNOSIS — N18.30 ANEMIA OF CHRONIC RENAL FAILURE, STAGE 3 (MODERATE) (HCC): ICD-10-CM

## 2019-03-18 LAB
25(OH)D3 SERPL-MCNC: 33 NG/ML (ref 30–100)
CK BB CFR SERPL ELPH: 36 %
EXT GLUCOSE BLD: 156
EXTERNAL ALBUMIN: 4.1
EXTERNAL ALK PHOS: 89
EXTERNAL ALT: 26
EXTERNAL AST: 18
EXTERNAL BUN: 37
EXTERNAL CALCIUM: 9.3
EXTERNAL CHLORIDE: 104
EXTERNAL CO2: 26
EXTERNAL CREATININE: 1.65
EXTERNAL EGFR: 28
EXTERNAL POTASSIUM: 4.6
EXTERNAL SODIUM: 138
EXTERNAL T.BILIRUBIN: 0.3
EXTERNAL TOTAL PROTEIN: 7.5
HCT VFR BLD AUTO: 36 % (ref 34.8–46.1)
HGB BLD-MCNC: 12.3 G/DL (ref 11.5–15.4)
MAGNESIUM SERPL-MCNC: 2 MG/DL (ref 1.6–2.6)
PHOSPHATE SERPL-MCNC: 3.4 MG/DL (ref 2.3–4.1)
PLATELET # BLD AUTO: 228 THOUSANDS/UL (ref 149–390)
PROT/CREAT UR: 0.2 MG/G{CREAT} (ref 0–0.1)
PTH-INTACT SERPL-MCNC: 45.1 PG/ML
WBC # BLD AUTO: 8.1 THOUSAND/UL

## 2019-03-18 PROCEDURE — 99214 OFFICE O/P EST MOD 30 MIN: CPT | Performed by: INTERNAL MEDICINE

## 2019-03-18 RX ORDER — CLONIDINE HYDROCHLORIDE 0.1 MG/1
0.1 TABLET ORAL EVERY 12 HOURS SCHEDULED
Qty: 60 TABLET | Refills: 5 | Status: SHIPPED | OUTPATIENT
Start: 2019-03-18 | End: 2019-04-18

## 2019-03-18 NOTE — LETTER
March 18, 2019     [address] [place] [address]  [place] [address]    Patient: Lee Kim   YOB: 1931   Date of Visit: 3/18/2019       Dear Dr Sarah Gutierres:    Thank you for referring Lee Kim to me for evaluation  Below are my notes for this consultation  If you have questions, please do not hesitate to call me  I look forward to following your patient along with you  Sincerely,        Shad Smith MD        CC: MD Bret Funez MD Reesa Poster, MD  3/18/2019 10:39 AM  Sign at close encounter  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  · Baseline creatinine:   1 45-1 67  · Current creatinine:    1 65 at baseline  · Urine protein creatinine ratio:  0  20 g at goal  Recommendations:  · Treat hypertension-please see below  · Treat dyslipidemia-please see below  · Maintain proteinuria less than 1 g or as low as possible  · Avoid nephrotoxic agents such as NSAIDs, patient counseled as such  2   Volume:  Chronic edema and part related to amlodipine; amlodipine had been decreased secondary to the edema  Overall edema is doing quite well, there is potential some venous stasis changes as well  For now no adjustments  -TSH was normal  -venous duplex was normal  3  Hypertension:  Chronic systolic hypertension with low diastolic readings and mild orthostasis in the past       Current blood pressure averages:  AM:   181/72, standing 156/63  PM:   185/72, standing 168/67     · Goal blood pressure:  Less than 130/80 given CAD  Recommendations:  ·    4  Electrolytes: All acceptable  5  Mineral bone disorder:  · Calcium/magnesium/phosphorus:  All acceptable  · PTH intact:  Normal  at 45 1  · Vitamin-D:   33 at goal  6  Dyslipidemia:  Typically handled by Cardiology  7  Anemia:   normal hemoglobin of 12 3  8    Other problems:  · Status post MI with cardiac stents/ Status post aortic valve replacement followed by Cardiology Dr Clayton Palumbo  · Diabetes mellitus per primary medical physician  · 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              PATIENT INSTRUCTIONS:    Patient Instructions   1  Medication changes today:  · Please begin clonidine 0 1 mg twice a day  The morning dose is with your other medications, the evening dose would be at bedtime  Please watch for a dry mouth, or tiredness and lethargy and please call me if any these symptoms are significant or concerning  · PLEASE CHECK WITH YOUR PHARMACY IF LOSARTAN IS ON RECALL    2  Please purchase another [employer] blood pressure machine but with a large cuff so a can fit on your arm better  Then please wait 1-2 weeks after making the above medication changes, and take 1 week a blood pressure readings morning evening, sitting and standing:  · Take the morning readings before any medications  · Take the evening readings closer to bedtime  · When taking standing readings, keep your arm supported at heart level and not dangling  THEN PLEASE BRING IN YOUR NEW BLOOD PRESSURE READINGS AND NEW BLOOD PRESSURE MACHINE TO SEE 1 OF MY ADVANCED PRACTITIONER'S IN ABOUT 2-3 WEEKS  3  Follow-up in 4 months:  -please bring in 1 week a blood pressure readings morning evening, sitting and standing is outlined above  -please go for fasting lab work prior to your appointment    4  General instructions:  -avoid salt  -avoid medications such as Motrin, Naprosyn, ibuprofen, Aleve or Advil or Celebrex as they can affect your kidney function; you can use Tylenol as needed for pain or fevers if you have no liver problems  -avoid medications such as Sudafed or other medications with decongestants as they can raise your blood pressure  -try to exercise at least 30 minutes at least 3 days a week with an ultimate goal of 5 days a week  -try to lose 5-10 lb by your next visit Subjective: The patient overall is feeling well  No fevers chills cough or colds  Good appetite and good energy  No urinary symptoms including foamy urine or blood in the urine  No gastrointestinal symptoms  No cardiovascular symptoms; her leg swelling is overall doing well  No headaches, dizziness or lightheadedness  Blood pressure medications:  -torsemide 20 mg daily except Monday Wednesday and Friday when she takes 30  -Toprol  mg twice a day  -losartan 25 mg after dinner  -amlodipine 5 mg daily    ROS:  See HPI, otherwise review of systems as completely reviewed with the patient are negative    Past Medical History:   Diagnosis Date    Diabetes mellitus ([facility] )     Hypertension      Past Surgical History:   Procedure Laterality Date    VALVE REPLACEMENT       Family History   Problem Relation Age of Onset    Heart disease Mother     Cancer Father       reports that she has never smoked  She has never used smokeless tobacco  She reports that she does not drink alcohol or use drugs  I COMPLETELY REVIEWED THE PAST MEDICAL HISTORY/PAST SURGICAL HISTORY/SOCIAL HISTORY/FAMILY HISTORY/AND MEDICATIONS  AND UPDATED ALL    Objective:     Vitals:   Vitals:    03/18/19 0946   BP: 151/67   Pulse: 67    BP sitting on right:  170/70 with a heart rate of 68 and slightly irregular; same on left  BP standing on right:  162/62 with a heart rate of 68 and slightly irregular    Weight (last 2 days)     Date/Time   Weight    03/18/19 0946   88 (194)            Wt Readings from Last 3 Encounters:   03/18/19 88 kg (194 lb)   10/25/18 88 9 kg (196 lb)   08/07/18 88 9 kg (196 lb)       Body mass index is 32 28 kg/m²      Physical Exam: General:  Mildly obese, but in No acute distress  Skin:  No acute rash  Eyes:  No scleral icterus, noninjected  ENT:  Moist mucous membranes  Neck:  Supple, no jugular venous distention  Back   No CVAT  Chest:  Clear to auscultation and percussion, good respiratory effort  CVS: Regular rate and rhythm without a rub, murmurs or gallops  Abdomen:  Obese, Soft and nontender with normal bowel sounds  Extremities:  No cyanosis and trace-1+ edema mostly nonpitting, venous stasis changes  Neuro:  Grossly intact  Psych:  Alert, oriented x3 and appropriate      Medications:    Current Outpatient Medications:     allopurinol (ZYLOPRIM) 100 mg tablet, Take 200 mg by mouth daily  , Disp: , Rfl:     allopurinol (ZYLOPRIM) 300 mg tablet, Take 1 tablet by mouth daily, Disp: , Rfl:     amLODIPine (NORVASC) 5 mg tablet, Take 1 tablet (5 mg total) by mouth daily in the early morning, Disp: 60 tablet, Rfl: 0    aspirin (ASPIRIN LOW DOSE) 81 MG tablet, Take 1 tablet by mouth daily, Disp: , Rfl:     cholecalciferol (VITAMIN D3) 1,000 units tablet, Take 1,000 Units by mouth, Disp: , Rfl:     clopidogrel (PLAVIX) 75 mg tablet, Take 75 mg by mouth daily, Disp: , Rfl:     docusate sodium (COLACE) 100 mg capsule, Take 1 capsule by mouth daily, Disp: , Rfl:     ferrous sulfate 325 (65 FE) MG EC tablet, Take 1 capsule by mouth daily, Disp: , Rfl:     losartan (COZAAR) 25 mg tablet, Take 1 tablet (25 mg total) by mouth daily after dinner, Disp: 90 tablet, Rfl: 0    metoprolol succinate (TOPROL-XL) 100 mg 24 hr tablet, Take 1 tablet by mouth 2 (two) times a day, Disp: , Rfl:     Multiple Vitamins-Minerals (PRESERVISION AREDS PO), Take 1 tablet by mouth daily, Disp: , Rfl:     sitaGLIPtin (JANUVIA) 50 mg tablet, Take 1 tablet by mouth daily, Disp: , Rfl:     torsemide (DEMADEX) 20 mg tablet, Take 1 tablet (20 mg total) by mouth daily Take 20 mg daily except for Monday Wednesday and Friday take 1-1/2 tablets equivalent to 30 mg, Disp: 235 tablet, Rfl: 3    cloNIDine (CATAPRES) 0 1 mg tablet, Take 1 tablet (0 1 mg total) by mouth every 12 (twelve) hours, Disp: 60 tablet, Rfl: 5    ergocalciferol (VITAMIN D2) 50,000 units, Take 1 capsule (50,000 Units total) by mouth once a week for 12 doses, Disp: 12 capsule, Rfl: 0    Lab, Imaging and other studies: I have personally reviewed pertinent labs  Laboratory Results:  Results for orders placed or performed in visit on 03/18/19   Comprehensive metabolic panel   Result Value Ref Range    SODIUM 138     POTASSIUM 4 6     CHLORIDE 104     CO2 26     BUN 37     CREATININE 1 65     Glucose 156     EXTERNAL CALCIUM 9 3     TOTAL PROTEIN 7 5     ALBUMIN 4 1     AST 18     ALT 26     ALK PHOS 89     EXTERNAL TOT  BILIRUBIN 0 3     EXTERNAL EGFR 28     Prot/Creat Ratio, Ur 0 20 (A) 0 00 - 0 10    CK-BB 36     Magnesium 2 0 1 6 - 2 6 mg/dL    Phosphorus 3 4 2 3 - 4 1 mg/dL    Vit D, 25-Hydroxy 33 0 30 0 - 100 0 ng/mL    Hemoglobin 12 3 11 5 - 15 4 g/dL    Hematocrit 36 0 34 8 - 46 1 %    WBC 8 10 Thousand/uL    Platelets 986 819 - 347 Thousands/uL    PTH, Intact 45 1        Results from last 7 days   Lab Units 03/12/19   WBC Thousand/uL 8 10   HEMOGLOBIN g/dL 12 3   HEMATOCRIT % 36 0   PLATELETS Thousands/uL 228   POTASSIUM  4 6   CHLORIDE  104   CO2  26   BUN  37   CREATININE  1 65   CALCIUM  9 3   MAGNESIUM mg/dL 2 0   PHOSPHORUS mg/dL 3 4         Radiology review:   chest X-ray    Ultrasound      Portions of the record may have been created with voice recognition software  Occasional wrong word or "sound a like" substitutions may have occurred due to the inherent limitations of voice recognition software  Read the chart carefully and recognize, using context, where substitutions have occurred

## 2019-03-18 NOTE — PROGRESS NOTES
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  · Baseline creatinine:  1 45-1 67  · Current creatinine:   1 65 at baseline  · Urine protein creatinine ratio:  0 20 g at goal  Recommendations:  · Treat hypertension-please see below  · Treat dyslipidemia-please see below  · Maintain proteinuria less than 1 g or as low as possible  · Avoid nephrotoxic agents such as NSAIDs, patient counseled as such  2   Volume:  Chronic edema and part related to amlodipine; amlodipine had been decreased secondary to the edema  Overall edema is doing quite well, there is potential some venous stasis changes as well  For now no adjustments  -TSH was normal  -venous duplex was normal  3  Hypertension:  Chronic systolic hypertension with low diastolic readings and mild orthostasis in the past       Current blood pressure averages:  AM:  181/72, standing 156/63  PM:  185/72, standing 168/67     · Goal blood pressure:  Less than 130/80 given CAD  Recommendations:  ·    4  Electrolytes: All acceptable  5  Mineral bone disorder:  · Calcium/magnesium/phosphorus:  All acceptable  · PTH intact:  Normal at 45 1  · Vitamin-D:  33 at goal  6  Dyslipidemia:  Typically handled by Cardiology  7  Anemia:  normal hemoglobin of 12 3  8  Other problems:  · Status post MI with cardiac stents/ Status post aortic valve replacement followed by Cardiology Dr Nova Whitley  · Diabetes mellitus per primary medical physician  · 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              PATIENT INSTRUCTIONS:    Patient Instructions   1  Medication changes today:  · Please begin clonidine 0 1 mg twice a day  The morning dose is with your other medications, the evening dose would be at bedtime  Please watch for a dry mouth, or tiredness and lethargy and please call me if any these symptoms are significant or concerning    · PLEASE CHECK WITH YOUR PHARMACY IF LOSARTAN IS ON RECALL    2  Please purchase another Om[employer] blood pressure machine but with a large cuff so a can fit on your arm better  Then please wait 1-2 weeks after making the above medication changes, and take 1 week a blood pressure readings morning evening, sitting and standing:  · Take the morning readings before any medications  · Take the evening readings closer to bedtime  · When taking standing readings, keep your arm supported at heart level and not dangling  THEN PLEASE BRING IN YOUR NEW BLOOD PRESSURE READINGS AND NEW BLOOD PRESSURE MACHINE TO SEE 1 OF MY ADVANCED PRACTITIONER'S IN ABOUT 2-3 WEEKS  3  Follow-up in 4 months:  -please bring in 1 week a blood pressure readings morning evening, sitting and standing is outlined above  -please go for fasting lab work prior to your appointment    4  General instructions:  -avoid salt  -avoid medications such as Motrin, Naprosyn, ibuprofen, Aleve or Advil or Celebrex as they can affect your kidney function; you can use Tylenol as needed for pain or fevers if you have no liver problems  -avoid medications such as Sudafed or other medications with decongestants as they can raise your blood pressure  -try to exercise at least 30 minutes at least 3 days a week with an ultimate goal of 5 days a week  -try to lose 5-10 lb by your next visit            Subjective: The patient overall is feeling well  No fevers chills cough or colds    Good appetite and good energy  No urinary symptoms including foamy urine or blood in the urine  No gastrointestinal symptoms  No cardiovascular symptoms; her leg swelling is overall doing well  No headaches, dizziness or lightheadedness  Blood pressure medications:  -torsemide 20 mg daily except Monday Wednesday and Friday when she takes 30  -Toprol  mg twice a day  -losartan 25 mg after dinner  -amlodipine 5 mg daily    ROS:  See HPI, otherwise review of systems as completely reviewed with the patient are negative    Past Medical History:   Diagnosis Date    Diabetes mellitus ([facility] [address] )     Hypertension      Past Surgical History:   Procedure Laterality Date    VALVE REPLACEMENT       Family History   Problem Relation Age of Onset    Heart disease Mother     Cancer Father       reports that she has never smoked  She has never used smokeless tobacco  She reports that she does not drink alcohol or use drugs  I COMPLETELY REVIEWED THE PAST MEDICAL HISTORY/PAST SURGICAL HISTORY/SOCIAL HISTORY/FAMILY HISTORY/AND MEDICATIONS  AND UPDATED ALL    Objective:     Vitals:   Vitals:    03/18/19 0946   BP: 151/67   Pulse: 67    BP sitting on right:  170/70 with a heart rate of 68 and slightly irregular; same on left  BP standing on right:  162/62 with a heart rate of 68 and slightly irregular    Weight (last 2 days)     Date/Time   Weight    03/18/19 0946   88 (194)            Wt Readings from Last 3 Encounters:   03/18/19 88 kg (194 lb)   10/25/18 88 9 kg (196 lb)   08/07/18 88 9 kg (196 lb)       Body mass index is 32 28 kg/m²      Physical Exam: General:  Mildly obese, but in No acute distress  Skin:  No acute rash  Eyes:  No scleral icterus, noninjected  ENT:  Moist mucous membranes  Neck:  Supple, no jugular venous distention  Back   No CVAT  Chest:  Clear to auscultation and percussion, good respiratory effort  CVS:  Regular rate and rhythm without a rub, murmurs or gallops  Abdomen:  Obese, Soft and nontender with normal bowel sounds  Extremities:  No cyanosis and trace-1+ edema mostly nonpitting, venous stasis changes  Neuro:  Grossly intact  Psych:  Alert, oriented x3 and appropriate      Medications:    Current Outpatient Medications:     allopurinol (ZYLOPRIM) 100 mg tablet, Take 200 mg by mouth daily  , Disp: , Rfl:     allopurinol (ZYLOPRIM) 300 mg tablet, Take 1 tablet by mouth daily, Disp: , Rfl:     amLODIPine (NORVASC) 5 mg tablet, Take 1 tablet (5 mg total) by mouth daily in the early morning, Disp: 60 tablet, Rfl: 0    aspirin (ASPIRIN LOW DOSE) 81 MG tablet, Take 1 tablet by mouth daily, Disp: , Rfl:     cholecalciferol (VITAMIN D3) 1,000 units tablet, Take 1,000 Units by mouth, Disp: , Rfl:     clopidogrel (PLAVIX) 75 mg tablet, Take 75 mg by mouth daily, Disp: , Rfl:     docusate sodium (COLACE) 100 mg capsule, Take 1 capsule by mouth daily, Disp: , Rfl:     ferrous sulfate 325 (65 FE) MG EC tablet, Take 1 capsule by mouth daily, Disp: , Rfl:     losartan (COZAAR) 25 mg tablet, Take 1 tablet (25 mg total) by mouth daily after dinner, Disp: 90 tablet, Rfl: 0    metoprolol succinate (TOPROL-XL) 100 mg 24 hr tablet, Take 1 tablet by mouth 2 (two) times a day, Disp: , Rfl:     Multiple Vitamins-Minerals (PRESERVISION AREDS PO), Take 1 tablet by mouth daily, Disp: , Rfl:     sitaGLIPtin (JANUVIA) 50 mg tablet, Take 1 tablet by mouth daily, Disp: , Rfl:     torsemide (DEMADEX) 20 mg tablet, Take 1 tablet (20 mg total) by mouth daily Take 20 mg daily except for Monday Wednesday and Friday take 1-1/2 tablets equivalent to 30 mg, Disp: 235 tablet, Rfl: 3    cloNIDine (CATAPRES) 0 1 mg tablet, Take 1 tablet (0 1 mg total) by mouth every 12 (twelve) hours, Disp: 60 tablet, Rfl: 5    ergocalciferol (VITAMIN D2) 50,000 units, Take 1 capsule (50,000 Units total) by mouth once a week for 12 doses, Disp: 12 capsule, Rfl: 0    Lab, Imaging and other studies: I have personally reviewed pertinent labs  Laboratory Results:  Results for orders placed or performed in visit on 03/18/19   Comprehensive metabolic panel   Result Value Ref Range    SODIUM 138     POTASSIUM 4 6     CHLORIDE 104     CO2 26     BUN 37     CREATININE 1 65     Glucose 156     EXTERNAL CALCIUM 9 3     TOTAL PROTEIN 7 5     ALBUMIN 4 1     AST 18     ALT 26     ALK PHOS 89     EXTERNAL TOT   BILIRUBIN 0 3     EXTERNAL EGFR 28     Prot/Creat Ratio, Ur 0 20 (A) 0 00 - 0 10    CK-BB 36     Magnesium 2 0 1 6 - 2 6 mg/dL    Phosphorus 3 4 2 3 - 4 1 mg/dL    Vit D, 25-Hydroxy 33 0 30 0 - 100 0 ng/mL    Hemoglobin 12 3 11 5 - 15 4 g/dL    Hematocrit 36 0 34 8 - 46 1 %    WBC 8 10 Thousand/uL    Platelets 383 387 - 791 Thousands/uL    PTH, Intact 45 1        Results from last 7 days   Lab Units 03/12/19   WBC Thousand/uL 8 10   HEMOGLOBIN g/dL 12 3   HEMATOCRIT % 36 0   PLATELETS Thousands/uL 228   POTASSIUM  4 6   CHLORIDE  104   CO2  26   BUN  37   CREATININE  1 65   CALCIUM  9 3   MAGNESIUM mg/dL 2 0   PHOSPHORUS mg/dL 3 4         Radiology review:   chest X-ray    Ultrasound      Portions of the record may have been created with voice recognition software  Occasional wrong word or "sound a like" substitutions may have occurred due to the inherent limitations of voice recognition software  Read the chart carefully and recognize, using context, where substitutions have occurred

## 2019-03-18 NOTE — PATIENT INSTRUCTIONS
1  Medication changes today:  · Please begin clonidine 0 1 mg twice a day  The morning dose is with your other medications, the evening dose would be at bedtime  Please watch for a dry mouth, or tiredness and lethargy and please call me if any these symptoms are significant or concerning  · PLEASE CHECK WITH YOUR PHARMACY IF LOSARTAN IS ON RECALL    2  Please purchase another [employer] blood pressure machine but with a large cuff so a can fit on your arm better  Then please wait 1-2 weeks after making the above medication changes, and take 1 week a blood pressure readings morning evening, sitting and standing:  · Take the morning readings before any medications  · Take the evening readings closer to bedtime  · When taking standing readings, keep your arm supported at heart level and not dangling  THEN PLEASE BRING IN YOUR NEW BLOOD PRESSURE READINGS AND NEW BLOOD PRESSURE MACHINE TO SEE 1 OF MY ADVANCED PRACTITIONER'S IN ABOUT 2-3 WEEKS  3  Follow-up in 4 months:  -please bring in 1 week a blood pressure readings morning evening, sitting and standing is outlined above  -please go for fasting lab work prior to your appointment    4  General instructions:  -avoid salt  -avoid medications such as Motrin, Naprosyn, ibuprofen, Aleve or Advil or Celebrex as they can affect your kidney function; you can use Tylenol as needed for pain or fevers if you have no liver problems  -avoid medications such as Sudafed or other medications with decongestants as they can raise your blood pressure  -try to exercise at least 30 minutes at least 3 days a week with an ultimate goal of 5 days a week  -try to lose 5-10 lb by your next visit

## 2019-03-20 ENCOUNTER — TELEPHONE (OUTPATIENT)
Dept: NEPHROLOGY | Facility: CLINIC | Age: 84
End: 2019-03-20

## 2019-03-20 DIAGNOSIS — I12.9 BENIGN HYPERTENSION WITH CHRONIC KIDNEY DISEASE, STAGE III (HCC): Primary | ICD-10-CM

## 2019-03-20 DIAGNOSIS — N18.30 ANEMIA OF CHRONIC RENAL FAILURE, STAGE 3 (MODERATE) (HCC): ICD-10-CM

## 2019-03-20 DIAGNOSIS — N18.30 BENIGN HYPERTENSION WITH CHRONIC KIDNEY DISEASE, STAGE III (HCC): Primary | ICD-10-CM

## 2019-03-20 DIAGNOSIS — N18.30 CHRONIC KIDNEY DISEASE, STAGE III (MODERATE) (HCC): ICD-10-CM

## 2019-03-20 DIAGNOSIS — E78.5 DYSLIPIDEMIA: ICD-10-CM

## 2019-03-20 DIAGNOSIS — D63.1 ANEMIA OF CHRONIC RENAL FAILURE, STAGE 3 (MODERATE) (HCC): ICD-10-CM

## 2019-03-20 NOTE — TELEPHONE ENCOUNTER
Stop clonidine  Increase losartan to 25 mg twice a day if not on recall  Repeat a basic metabolic profile 1-2 weeks  Recheck blood pressures in 3 weeks morning and evening, sitting and standing:  · Take the morning readings before any medications  · Take the evening readings closer to bedtime  · When taking standing readings, keep your arm supported at heart level and not dangling  Please send those readings in

## 2019-03-20 NOTE — TELEPHONE ENCOUNTER
Daughter called the office and she stated that pt took first dosage of clonidine 0 1mg BID yesterday  Daughter stated that pt looked terrible, tired, sleeping all day,dry mouth, lips peeling  Would like an alternative

## 2019-04-03 LAB
EXT MICROALBUMIN URINE RANDOM: 3.4
HBA1C MFR BLD HPLC: 7.2 %

## 2019-04-05 ENCOUNTER — TELEPHONE (OUTPATIENT)
Dept: NEPHROLOGY | Facility: CLINIC | Age: 84
End: 2019-04-05

## 2019-04-05 LAB
ALBUMIN 24H MFR UR ELPH: 3.4 %
BILIRUB DIRECT SERPL-MCNC: 0.1 MG/DL (ref 0–0.2)
CHOLEST SERPL-MCNC: 209 MG/DL (ref 50–200)
CHOLEST/HDLC SERPL: 6.53 {RATIO}
EXT GLUCOSE BLD: 154
EXTERNAL ALBUMIN: 4
EXTERNAL ALK PHOS: 84
EXTERNAL ALT: 20
EXTERNAL AST: 14
EXTERNAL BUN: 46
EXTERNAL CALCIUM: 9.2
EXTERNAL CHLORIDE: 105
EXTERNAL CO2: 25
EXTERNAL CREATININE: 1.76
EXTERNAL EGFR: 26
EXTERNAL POTASSIUM: 4.6
EXTERNAL SODIUM: 140
EXTERNAL T.BILIRUBIN: 0.5
EXTERNAL TOTAL PROTEIN: 7.4
HBA1C MFR BLD: 7.2 % (ref 4.2–6.3)
HCT VFR BLD AUTO: 35.1 % (ref 34.8–46.1)
HDLC SERPL-MCNC: 32 MG/DL (ref 40–60)
HGB BLD-MCNC: 11.9 G/DL (ref 11.5–15.4)
NONHDLC SERPL-MCNC: 117 MG/DL
PLATELET # BLD AUTO: 187 THOUSANDS/UL (ref 149–390)
TRIGL SERPL-MCNC: 558 MG/DL (ref ?–150)
TSH SERPL DL<=0.005 MIU/L-ACNC: 2.22 M[IU]/L
URATE SERPL-MCNC: 4.3 MG/DL (ref 2–6.8)
WBC # BLD AUTO: 7.4 THOUSAND/UL

## 2019-04-18 ENCOUNTER — TELEPHONE (OUTPATIENT)
Dept: NEPHROLOGY | Facility: CLINIC | Age: 84
End: 2019-04-18

## 2019-04-18 DIAGNOSIS — N18.30 CHRONIC KIDNEY DISEASE, STAGE III (MODERATE) (HCC): Primary | ICD-10-CM

## 2019-04-18 DIAGNOSIS — E78.5 DYSLIPIDEMIA: ICD-10-CM

## 2019-04-18 DIAGNOSIS — I12.9 PARENCHYMAL RENAL HYPERTENSION, STAGE 1 THROUGH STAGE 4 OR UNSPECIFIED CHRONIC KIDNEY DISEASE: ICD-10-CM

## 2019-04-18 DIAGNOSIS — N18.30 ANEMIA OF CHRONIC RENAL FAILURE, STAGE 3 (MODERATE) (HCC): ICD-10-CM

## 2019-04-18 DIAGNOSIS — E83.52 HYPERCALCEMIA: ICD-10-CM

## 2019-04-18 DIAGNOSIS — D63.1 ANEMIA OF CHRONIC RENAL FAILURE, STAGE 3 (MODERATE) (HCC): ICD-10-CM

## 2019-04-18 RX ORDER — LOSARTAN POTASSIUM 50 MG/1
50 TABLET ORAL 2 TIMES DAILY
Qty: 180 TABLET | Refills: 3 | Status: SHIPPED | OUTPATIENT
Start: 2019-04-18 | End: 2019-05-07

## 2019-04-30 ENCOUNTER — TELEPHONE (OUTPATIENT)
Dept: NEPHROLOGY | Facility: CLINIC | Age: 84
End: 2019-04-30

## 2019-04-30 DIAGNOSIS — D63.1 ANEMIA OF CHRONIC RENAL FAILURE, STAGE 3 (MODERATE) (HCC): ICD-10-CM

## 2019-04-30 DIAGNOSIS — E83.52 HYPERCALCEMIA: ICD-10-CM

## 2019-04-30 DIAGNOSIS — N18.30 BENIGN HYPERTENSION WITH CHRONIC KIDNEY DISEASE, STAGE III (HCC): ICD-10-CM

## 2019-04-30 DIAGNOSIS — N18.30 CHRONIC KIDNEY DISEASE, STAGE III (MODERATE) (HCC): Primary | ICD-10-CM

## 2019-04-30 DIAGNOSIS — N18.30 ANEMIA OF CHRONIC RENAL FAILURE, STAGE 3 (MODERATE) (HCC): ICD-10-CM

## 2019-04-30 DIAGNOSIS — E78.5 DYSLIPIDEMIA: ICD-10-CM

## 2019-04-30 DIAGNOSIS — I12.9 BENIGN HYPERTENSION WITH CHRONIC KIDNEY DISEASE, STAGE III (HCC): ICD-10-CM

## 2019-05-01 ENCOUNTER — OFFICE VISIT (OUTPATIENT)
Dept: NEPHROLOGY | Facility: CLINIC | Age: 84
End: 2019-05-01
Payer: MEDICARE

## 2019-05-01 VITALS
BODY MASS INDEX: 32.59 KG/M2 | DIASTOLIC BLOOD PRESSURE: 64 MMHG | SYSTOLIC BLOOD PRESSURE: 140 MMHG | WEIGHT: 195.6 LBS | HEIGHT: 65 IN

## 2019-05-01 DIAGNOSIS — N18.30 STAGE 3 CHRONIC KIDNEY DISEASE (HCC): Primary | ICD-10-CM

## 2019-05-01 DIAGNOSIS — I12.9 BENIGN HYPERTENSION WITH CHRONIC KIDNEY DISEASE, STAGE III (HCC): ICD-10-CM

## 2019-05-01 DIAGNOSIS — N18.30 BENIGN HYPERTENSION WITH CHRONIC KIDNEY DISEASE, STAGE III (HCC): ICD-10-CM

## 2019-05-01 DIAGNOSIS — R60.0 LOCALIZED EDEMA: ICD-10-CM

## 2019-05-01 DIAGNOSIS — N18.30 CHRONIC KIDNEY DISEASE, STAGE III (MODERATE) (HCC): ICD-10-CM

## 2019-05-01 LAB
EXT GLUCOSE BLD: 160
EXTERNAL BUN: 49
EXTERNAL CALCIUM: 9.5
EXTERNAL CHLORIDE: 104
EXTERNAL CO2: 26
EXTERNAL CREATININE: 1.89
EXTERNAL EGFR: 25
EXTERNAL POTASSIUM: 4.6
EXTERNAL SODIUM: 138

## 2019-05-01 PROCEDURE — 99213 OFFICE O/P EST LOW 20 MIN: CPT | Performed by: NURSE PRACTITIONER

## 2019-05-07 ENCOUNTER — TELEPHONE (OUTPATIENT)
Dept: OTHER | Facility: HOSPITAL | Age: 84
End: 2019-05-07

## 2019-05-07 DIAGNOSIS — N18.30 CKD (CHRONIC KIDNEY DISEASE) STAGE 3, GFR 30-59 ML/MIN (HCC): ICD-10-CM

## 2019-05-07 DIAGNOSIS — N17.9 ACUTE KIDNEY INJURY (HCC): Primary | ICD-10-CM

## 2019-05-07 DIAGNOSIS — I12.9 PARENCHYMAL RENAL HYPERTENSION, STAGE 1 THROUGH STAGE 4 OR UNSPECIFIED CHRONIC KIDNEY DISEASE: ICD-10-CM

## 2019-05-07 RX ORDER — LOSARTAN POTASSIUM 50 MG/1
25 TABLET ORAL 2 TIMES DAILY
Qty: 180 TABLET | Refills: 0 | Status: SHIPPED | OUTPATIENT
Start: 2019-05-07 | End: 2019-07-22

## 2019-05-07 RX ORDER — AMLODIPINE BESYLATE 5 MG/1
5 TABLET ORAL 2 TIMES DAILY
Qty: 60 TABLET | Refills: 3 | Status: SHIPPED | OUTPATIENT
Start: 2019-05-07 | End: 2019-07-22

## 2019-05-08 ENCOUNTER — DOCUMENTATION (OUTPATIENT)
Dept: OTHER | Facility: HOSPITAL | Age: 84
End: 2019-05-08

## 2019-05-14 ENCOUNTER — TELEPHONE (OUTPATIENT)
Dept: NEPHROLOGY | Facility: CLINIC | Age: 84
End: 2019-05-14

## 2019-05-14 DIAGNOSIS — R60.0 LOCALIZED EDEMA: ICD-10-CM

## 2019-05-14 DIAGNOSIS — D63.1 ANEMIA OF CHRONIC RENAL FAILURE, STAGE 3 (MODERATE) (HCC): ICD-10-CM

## 2019-05-14 DIAGNOSIS — N18.30 ANEMIA OF CHRONIC RENAL FAILURE, STAGE 3 (MODERATE) (HCC): ICD-10-CM

## 2019-05-14 DIAGNOSIS — I12.9 BENIGN HYPERTENSION WITH CHRONIC KIDNEY DISEASE, STAGE III (HCC): ICD-10-CM

## 2019-05-14 DIAGNOSIS — N18.30 STAGE 3 CHRONIC KIDNEY DISEASE (HCC): Primary | ICD-10-CM

## 2019-05-14 DIAGNOSIS — N18.30 BENIGN HYPERTENSION WITH CHRONIC KIDNEY DISEASE, STAGE III (HCC): ICD-10-CM

## 2019-05-14 LAB
EXT GLUCOSE BLD: 174
EXTERNAL BUN: 57
EXTERNAL CALCIUM: 9.7
EXTERNAL CHLORIDE: 104
EXTERNAL CO2: 22
EXTERNAL CREATININE: 1.96
EXTERNAL EGFR: 23
EXTERNAL POTASSIUM: 4.3
EXTERNAL SODIUM: 137

## 2019-05-15 ENCOUNTER — TELEPHONE (OUTPATIENT)
Dept: NEPHROLOGY | Facility: CLINIC | Age: 84
End: 2019-05-15

## 2019-06-06 ENCOUNTER — TELEPHONE (OUTPATIENT)
Dept: NEPHROLOGY | Facility: CLINIC | Age: 84
End: 2019-06-06

## 2019-07-02 LAB
CREAT ?TM UR-SCNC: 52.2 UMOL/L
EXT PROTEIN URINE: 15.4
PROT/CREAT UR: 0.3 MG/G{CREAT}

## 2019-07-22 ENCOUNTER — OFFICE VISIT (OUTPATIENT)
Dept: NEPHROLOGY | Facility: CLINIC | Age: 84
End: 2019-07-22
Payer: MEDICARE

## 2019-07-22 VITALS
WEIGHT: 197.8 LBS | DIASTOLIC BLOOD PRESSURE: 62 MMHG | HEART RATE: 80 BPM | SYSTOLIC BLOOD PRESSURE: 154 MMHG | HEIGHT: 65 IN | BODY MASS INDEX: 32.96 KG/M2

## 2019-07-22 DIAGNOSIS — N18.30 ANEMIA OF CHRONIC RENAL FAILURE, STAGE 3 (MODERATE) (HCC): ICD-10-CM

## 2019-07-22 DIAGNOSIS — I12.9 BENIGN HYPERTENSION WITH CHRONIC KIDNEY DISEASE, STAGE III (HCC): Primary | ICD-10-CM

## 2019-07-22 DIAGNOSIS — N18.30 CHRONIC KIDNEY DISEASE, STAGE III (MODERATE) (HCC): ICD-10-CM

## 2019-07-22 DIAGNOSIS — D63.1 ANEMIA OF CHRONIC RENAL FAILURE, STAGE 3 (MODERATE) (HCC): ICD-10-CM

## 2019-07-22 DIAGNOSIS — E78.5 DYSLIPIDEMIA: ICD-10-CM

## 2019-07-22 DIAGNOSIS — N18.30 BENIGN HYPERTENSION WITH CHRONIC KIDNEY DISEASE, STAGE III (HCC): Primary | ICD-10-CM

## 2019-07-22 DIAGNOSIS — I12.9 PARENCHYMAL RENAL HYPERTENSION, STAGE 1 THROUGH STAGE 4 OR UNSPECIFIED CHRONIC KIDNEY DISEASE: ICD-10-CM

## 2019-07-22 DIAGNOSIS — R60.0 LOCALIZED EDEMA: ICD-10-CM

## 2019-07-22 DIAGNOSIS — I12.9 NEPHROSCLEROSIS, STAGE 1 THROUGH STAGE 4 OR UNSPECIFIED CHRONIC KIDNEY DISEASE: ICD-10-CM

## 2019-07-22 PROCEDURE — 99214 OFFICE O/P EST MOD 30 MIN: CPT | Performed by: INTERNAL MEDICINE

## 2019-07-22 RX ORDER — LOSARTAN POTASSIUM 50 MG/1
50 TABLET ORAL 2 TIMES DAILY
Qty: 180 TABLET | Refills: 0 | Status: SHIPPED | OUTPATIENT
Start: 2019-07-22 | End: 2020-02-21

## 2019-07-22 RX ORDER — ERGOCALCIFEROL 1.25 MG/1
50000 CAPSULE ORAL WEEKLY
Qty: 12 CAPSULE | Refills: 0 | Status: SHIPPED | OUTPATIENT
Start: 2019-07-22 | End: 2020-02-21

## 2019-07-22 RX ORDER — AMLODIPINE BESYLATE 5 MG/1
5 TABLET ORAL DAILY
Qty: 90 TABLET | Refills: 3 | Status: SHIPPED | OUTPATIENT
Start: 2019-07-22

## 2019-07-22 NOTE — PATIENT INSTRUCTIONS
1  Medication changes today:  -increase losartan to 50 mg twice a day  -please take vitamin-D 09679 units weekly for 12 weeks  -also, take vitamin-D 2000 units over the counter on a daily basis    2  Please wait 1-2 weeks and go for nonfasting lab work    3  Please wait 2-3 weeks and take an additional week of blood pressure readings morning evening just sitting:  -the morning readings before taking any medications  -the evening readings closer to bedtime but before taking her evening medications  Please send those in for further recommendations      4   Follow-up in 4 months:  -please bring in 1 week a blood pressure readings morning evening, sitting and standing:  · Take the morning readings before any medications  · Take the evening readings closer to bedtime  · When taking standing readings, keep your arm supported at heart level and not dangling  -please go for nonfasting lab work but in the morning prior to your appointment    General instructions:  -avoid salt  -avoid medications such as Motrin, Naprosyn, ibuprofen, Aleve or Advil or Celebrex as they can affect your kidney function; you can use Tylenol as needed for pain or fevers if you have no liver problems  -avoid medications such as Sudafed or other medications with decongestants as they can raise your blood pressure  -try to exercise/walk and keeping active as much as possible  -try to lose 5-10 lb by your next visit

## 2019-07-22 NOTE — LETTER
July 22, 2019     [address] [facility] [address]    Patient: Cee Hoffmann   YOB: 1931   Date of Visit: 7/22/2019       Dear Dr Gibson Maker:    Thank you for referring Cee Hoffmann to me for evaluation  Below are my notes for this consultation  If you have questions, please do not hesitate to call me  I look forward to following your patient along with you           Sincerely,        Vladimir Hawk MD        CC: MD Brian Farfan MD Boni Miser, MD  7/22/2019 10:56 AM  Sign at close encounter  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  · Baseline creatinine:  1 45- 1 89  · Current creatinine:   1  79 at baseline  · Urine protein creatinine ratio:  0 30 g at goal  Recommendations:  · Treat hypertension-please see below  · Treat dyslipidemia-please see below  · Maintain proteinuria less than 1 g or as low as possible  · Avoid nephrotoxic agents such as NSAIDs, patient counseled as such  2   Volume:  Chronic edema and part related to amlodipine; amlodipine had been decreased secondary to the edema:  Of note:  -TSH was normal  -Venous duplex was normal  3   Hypertension:  Chronic systolic hypertension with low diastolic readings and mild orthostasis in the past       Current blood pressure averages:  AM:  189/78 without orthostatic changes  PM:  191/76 without orthostatic changes     · Goal blood pressure:  Less than 130/80 given CAD  Recommendations:  ·  Avoid salt  · Increase losartan to 50 mg twice a day  · Consideration for Hydralazine and possibly increasing the torsemide 30 mg daily  4   Electrolytes:  All acceptable  5   Mineral bone disorder:  · Calcium/magnesium/phosphorus:  All acceptable  · PTH intact:  Normal at  38 3  · Vitamin-D:   23 REPLETE  6   Dyslipidemia:  Typically handled by Cardiology  7   Anemia:  normal hemoglobin of 11 5  8   Other problems:  · Status post MI with cardiac stents/ Status post aortic valve replacement followed by Cardiology Dr Mau Riggs  · Diabetes mellitus per primary medical physician  · 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          PATIENT INSTRUCTIONS:    Patient Instructions   1  Medication changes today:  -increase losartan to 50 mg twice a day  -please take vitamin-D 94372 units weekly for 12 weeks  -also, take vitamin-D 2000 units over the counter on a daily basis    2  Please wait 1-2 weeks and go for nonfasting lab work    3  Please wait 2-3 weeks and take an additional week of blood pressure readings morning evening just sitting:  -the morning readings before taking any medications  -the evening readings closer to bedtime but before taking her evening medications  Please send those in for further recommendations      4  Follow-up in 4 months:  -please bring in 1 week a blood pressure readings morning evening, sitting and standing:  · Take the morning readings before any medications  · Take the evening readings closer to bedtime  · When taking standing readings, keep your arm supported at heart level and not dangling  -please go for nonfasting lab work but in the morning prior to your appointment    General instructions:  -avoid salt  -avoid medications such as Motrin, Naprosyn, ibuprofen, Aleve or Advil or Celebrex as they can affect your kidney function; you can use Tylenol as needed for pain or fevers if you have no liver problems  -avoid medications such as Sudafed or other medications with decongestants as they can raise your blood pressure  -try to exercise/walk and keeping active as much as possible  -try to lose 5-10 lb by your next visit          Subjective: The patient overall is feeling well  No fevers chills cough or colds    Good appetite and good energy  No urinary symptoms including foamy urine or blood in the urine  No gastrointestinal symptoms  No cardiovascular symptoms; occasional pedal edema but overall better and she does keep her legs elevated  No headaches, dizziness or lightheadedness  Blood pressure medications:  -torsemide 20 mg daily except for Monday Wednesday and Friday she takes 30 mg  -Toprol  mg twice a day  -losartan 25 mg twice a day  -amlodipine 5 mg daily in the morning      ROS:  See HPI, otherwise review of systems as completely reviewed with the patient are negative    Past Medical History:   Diagnosis Date    Diabetes mellitus ([address] )     Hypertension      Past Surgical History:   Procedure Laterality Date    VALVE REPLACEMENT       Family History   Problem Relation Age of Onset    Heart disease Mother     Cancer Father       reports that she has never smoked  She has never used smokeless tobacco  She reports that she does not drink alcohol or use drugs  I COMPLETELY REVIEWED THE PAST MEDICAL HISTORY/PAST SURGICAL HISTORY/SOCIAL HISTORY/FAMILY HISTORY/AND MEDICATIONS  AND UPDATED ALL    Objective:     Vitals:   Vitals:    07/22/19 1008   BP: 154/62   Pulse: 80    BP sitting on right:  158/64 with a heart rate of 72 and slightly irregular, same on left  BP standing on right:  158/56 with a heart rate of 64 and regular    Weight (last 2 days)     Date/Time   Weight    07/22/19 1008   89 7 (197 8)            Wt Readings from Last 3 Encounters:   07/22/19 89 7 kg (197 lb 12 8 oz)   05/01/19 88 7 kg (195 lb 9 6 oz)   03/18/19 88 kg (194 lb)       Body mass index is 32 92 kg/m²      Physical Exam: General:  Obese, but in No acute distress  Skin:  No acute rash  Eyes:  No scleral icterus, noninjected  ENT:  Moist mucous membranes  Neck:  Supple, no jugular venous distention  Back   No CVAT  Chest:  Clear to auscultation and percussion, good respiratory effort  CVS:  Regular rate and rhythm without a rub, murmurs or gallops  Abdomen:  Obese, Soft and nontender with normal bowel sounds  Extremities:  No cyanosis and only trace-1+ nonpitting lower extremity edema 1/3 the way up the pretibial region bilaterally  Neuro:  Grossly intact  Psych:  Alert, oriented x3 and appropriate      Medications:    Current Outpatient Medications:     allopurinol (ZYLOPRIM) 100 mg tablet, Take 200 mg by mouth daily  , Disp: , Rfl:     allopurinol (ZYLOPRIM) 300 mg tablet, Take 1 tablet by mouth daily, Disp: , Rfl:     amLODIPine (NORVASC) 5 mg tablet, Take 1 tablet (5 mg total) by mouth daily, Disp: 90 tablet, Rfl: 3    aspirin (ASPIRIN LOW DOSE) 81 MG tablet, Take 1 tablet by mouth daily, Disp: , Rfl:     cholecalciferol (VITAMIN D3) 1,000 units tablet, Take 1,000 Units by mouth daily , Disp: , Rfl:     clopidogrel (PLAVIX) 75 mg tablet, Take 75 mg by mouth daily, Disp: , Rfl:     docusate sodium (COLACE) 100 mg capsule, Take 1 capsule by mouth daily, Disp: , Rfl:     ferrous sulfate 325 (65 FE) MG EC tablet, Take 1 capsule by mouth daily, Disp: , Rfl:     losartan (COZAAR) 50 mg tablet, Take 1 tablet (50 mg total) by mouth 2 (two) times a day, Disp: 180 tablet, Rfl: 0    metoprolol succinate (TOPROL-XL) 100 mg 24 hr tablet, Take 1 tablet by mouth 2 (two) times a day, Disp: , Rfl:     sitaGLIPtin (JANUVIA) 50 mg tablet, Take 1 tablet by mouth daily, Disp: , Rfl:     torsemide (DEMADEX) 20 mg tablet, Take 1 tablet (20 mg total) by mouth daily Take 20 mg daily except for Monday Wednesday and Friday take 1-1/2 tablets equivalent to 30 mg, Disp: 235 tablet, Rfl: 3    ergocalciferol (VITAMIN D2) 50,000 units, Take 1 capsule (50,000 Units total) by mouth once a week for 78 days, Disp: 12 capsule, Rfl: 0    Multiple Vitamins-Minerals (PRESERVISION AREDS PO), Take 1 tablet by mouth daily, Disp: , Rfl:     Lab, Imaging and other studies: I have personally reviewed pertinent labs    Laboratory Results:  Results for orders placed or performed in visit on 07/02/19   Protein / creatinine ratio, urine   Result Value Ref Range    PROTEIN UA 15 4     EXT Creatinine Urine 52 2 EXTERNAL Ur Prot/Creat Ratio 0 30              Invalid input(s): ALBUMIN      Radiology review:   chest X-ray    Ultrasound      Portions of the record may have been created with voice recognition software  Occasional wrong word or "sound a like" substitutions may have occurred due to the inherent limitations of voice recognition software  Read the chart carefully and recognize, using context, where substitutions have occurred

## 2019-07-22 NOTE — PROGRESS NOTES
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  · Baseline creatinine:  1 45-1 89  · Current creatinine:   1 79 at baseline  · Urine protein creatinine ratio:  0 30 g at goal  Recommendations:  · Treat hypertension-please see below  · Treat dyslipidemia-please see below  · Maintain proteinuria less than 1 g or as low as possible  · Avoid nephrotoxic agents such as NSAIDs, patient counseled as such  2   Volume:  Chronic edema and part related to amlodipine; amlodipine had been decreased secondary to the edema:  Of note:  -TSH was normal  -Venous duplex was normal  3   Hypertension:  Chronic systolic hypertension with low diastolic readings and mild orthostasis in the past       Current blood pressure averages:  AM:  189/78 without orthostatic changes  PM:  191/76 without orthostatic changes     · Goal blood pressure:  Less than 130/80 given CAD  Recommendations:  ·  Avoid salt  · Increase losartan to 50 mg twice a day  · Consideration for Hydralazine and possibly increasing the torsemide 30 mg daily  4   Electrolytes:  All acceptable  5   Mineral bone disorder:  · Calcium/magnesium/phosphorus:  All acceptable  · PTH intact:  Normal at 38 3  · Vitamin-D:  23 REPLETE  6   Dyslipidemia:  Typically handled by Cardiology  7   Anemia:  normal hemoglobin of 11 5  8   Other problems:  · Status post MI with cardiac stents/ Status post aortic valve replacement followed by Cardiology Dr Katharine Basilio  · Diabetes mellitus per primary medical physician  · 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          PATIENT INSTRUCTIONS:    Patient Instructions   1  Medication changes today:  -increase losartan to 50 mg twice a day  -please take vitamin-D 27594 units weekly for 12 weeks  -also, take vitamin-D 2000 units over the counter on a daily basis    2  Please wait 1-2 weeks and go for nonfasting lab work    3   Please wait 2-3 weeks and take an additional week of blood pressure readings morning evening just sitting:  -the morning readings before taking any medications  -the evening readings closer to bedtime but before taking her evening medications  Please send those in for further recommendations      4  Follow-up in 4 months:  -please bring in 1 week a blood pressure readings morning evening, sitting and standing:  · Take the morning readings before any medications  · Take the evening readings closer to bedtime  · When taking standing readings, keep your arm supported at heart level and not dangling  -please go for nonfasting lab work but in the morning prior to your appointment    General instructions:  -avoid salt  -avoid medications such as Motrin, Naprosyn, ibuprofen, Aleve or Advil or Celebrex as they can affect your kidney function; you can use Tylenol as needed for pain or fevers if you have no liver problems  -avoid medications such as Sudafed or other medications with decongestants as they can raise your blood pressure  -try to exercise/walk and keeping active as much as possible  -try to lose 5-10 lb by your next visit          Subjective: The patient overall is feeling well  No fevers chills cough or colds    Good appetite and good energy  No urinary symptoms including foamy urine or blood in the urine  No gastrointestinal symptoms  No cardiovascular symptoms; occasional pedal edema but overall better and she does keep her legs elevated  No headaches, dizziness or lightheadedness  Blood pressure medications:  -torsemide 20 mg daily except for Monday Wednesday and Friday she takes 30 mg  -Toprol  mg twice a day  -losartan 25 mg twice a day  -amlodipine 5 mg daily in the morning      ROS:  See HPI, otherwise review of systems as completely reviewed with the patient are negative    Past Medical History:   Diagnosis Date    Diabetes mellitus ([facility] [address] )     Hypertension      Past Surgical History:   Procedure Laterality Date  VALVE REPLACEMENT       Family History   Problem Relation Age of Onset    Heart disease Mother     Cancer Father       reports that she has never smoked  She has never used smokeless tobacco  She reports that she does not drink alcohol or use drugs  I COMPLETELY REVIEWED THE PAST MEDICAL HISTORY/PAST SURGICAL HISTORY/SOCIAL HISTORY/FAMILY HISTORY/AND MEDICATIONS  AND UPDATED ALL    Objective:     Vitals:   Vitals:    07/22/19 1008   BP: 154/62   Pulse: 80    BP sitting on right:  158/64 with a heart rate of 72 and slightly irregular, same on left  BP standing on right:  158/56 with a heart rate of 64 and regular    Weight (last 2 days)     Date/Time   Weight    07/22/19 1008   89 7 (197 8)            Wt Readings from Last 3 Encounters:   07/22/19 89 7 kg (197 lb 12 8 oz)   05/01/19 88 7 kg (195 lb 9 6 oz)   03/18/19 88 kg (194 lb)       Body mass index is 32 92 kg/m²      Physical Exam: General:  Obese, but in No acute distress  Skin:  No acute rash  Eyes:  No scleral icterus, noninjected  ENT:  Moist mucous membranes  Neck:  Supple, no jugular venous distention  Back   No CVAT  Chest:  Clear to auscultation and percussion, good respiratory effort  CVS:  Regular rate and rhythm without a rub, murmurs or gallops  Abdomen:  Obese, Soft and nontender with normal bowel sounds  Extremities:  No cyanosis and only trace-1+ nonpitting lower extremity edema 1/3 the way up the pretibial region bilaterally  Neuro:  Grossly intact  Psych:  Alert, oriented x3 and appropriate      Medications:    Current Outpatient Medications:     allopurinol (ZYLOPRIM) 100 mg tablet, Take 200 mg by mouth daily  , Disp: , Rfl:     allopurinol (ZYLOPRIM) 300 mg tablet, Take 1 tablet by mouth daily, Disp: , Rfl:     amLODIPine (NORVASC) 5 mg tablet, Take 1 tablet (5 mg total) by mouth daily, Disp: 90 tablet, Rfl: 3    aspirin (ASPIRIN LOW DOSE) 81 MG tablet, Take 1 tablet by mouth daily, Disp: , Rfl:     cholecalciferol (VITAMIN D3) 1,000 units tablet, Take 1,000 Units by mouth daily , Disp: , Rfl:     clopidogrel (PLAVIX) 75 mg tablet, Take 75 mg by mouth daily, Disp: , Rfl:     docusate sodium (COLACE) 100 mg capsule, Take 1 capsule by mouth daily, Disp: , Rfl:     ferrous sulfate 325 (65 FE) MG EC tablet, Take 1 capsule by mouth daily, Disp: , Rfl:     losartan (COZAAR) 50 mg tablet, Take 1 tablet (50 mg total) by mouth 2 (two) times a day, Disp: 180 tablet, Rfl: 0    metoprolol succinate (TOPROL-XL) 100 mg 24 hr tablet, Take 1 tablet by mouth 2 (two) times a day, Disp: , Rfl:     sitaGLIPtin (JANUVIA) 50 mg tablet, Take 1 tablet by mouth daily, Disp: , Rfl:     torsemide (DEMADEX) 20 mg tablet, Take 1 tablet (20 mg total) by mouth daily Take 20 mg daily except for Monday Wednesday and Friday take 1-1/2 tablets equivalent to 30 mg, Disp: 235 tablet, Rfl: 3    ergocalciferol (VITAMIN D2) 50,000 units, Take 1 capsule (50,000 Units total) by mouth once a week for 78 days, Disp: 12 capsule, Rfl: 0    Multiple Vitamins-Minerals (PRESERVISION AREDS PO), Take 1 tablet by mouth daily, Disp: , Rfl:     Lab, Imaging and other studies: I have personally reviewed pertinent labs  Laboratory Results:  Results for orders placed or performed in visit on 07/02/19   Protein / creatinine ratio, urine   Result Value Ref Range    PROTEIN UA 15 4     EXT Creatinine Urine 52 2     EXTERNAL Ur Prot/Creat Ratio 0 30              Invalid input(s): ALBUMIN      Radiology review:   chest X-ray    Ultrasound      Portions of the record may have been created with voice recognition software  Occasional wrong word or "sound a like" substitutions may have occurred due to the inherent limitations of voice recognition software  Read the chart carefully and recognize, using context, where substitutions have occurred

## 2019-07-25 ENCOUNTER — TELEPHONE (OUTPATIENT)
Dept: NEPHROLOGY | Facility: CLINIC | Age: 84
End: 2019-07-25

## 2019-07-25 NOTE — TELEPHONE ENCOUNTER
Patient daughter called the office and stated that [facility] cardiologist agreed with switching patient losartan 50 mg in the Am, but  switched evening dosage to 25 mg due to possible dizziness when standing up at night

## 2019-07-25 NOTE — TELEPHONE ENCOUNTER
Have the patient send in 1 week a blood pressure readings in about 2-3 weeks morning evening, sitting and standing:  · Take the morning readings before any medications  · Take the evening readings closer to bedtime  · When taking standing readings, keep your arm supported at heart level and not dangling  And then we can determine if we have to alter her losartan dose

## 2019-08-07 ENCOUNTER — DOCUMENTATION (OUTPATIENT)
Dept: NEPHROLOGY | Facility: CLINIC | Age: 84
End: 2019-08-07

## 2019-08-07 NOTE — PROGRESS NOTES
Current blood pressure readings:  -a m :  181/71 without orthostatic changes  -p  m :  173/71 without orthostatic changes  Heart rate 70-80    I would recommend the followin  Hydralazine 25 mg twice a day  2   Please wait 2-3 weeks and then take another week a blood pressure readings morning evening just sitting:  -the morning readings before taking any medications  -the evening readings closer to bedtime before taking medications  Please send these in

## 2019-08-08 DIAGNOSIS — N18.30 BENIGN HYPERTENSION WITH CHRONIC KIDNEY DISEASE, STAGE III (HCC): ICD-10-CM

## 2019-08-08 DIAGNOSIS — N18.30 CHRONIC KIDNEY DISEASE, STAGE III (MODERATE) (HCC): Primary | ICD-10-CM

## 2019-08-08 DIAGNOSIS — I12.9 BENIGN HYPERTENSION WITH CHRONIC KIDNEY DISEASE, STAGE III (HCC): ICD-10-CM

## 2019-08-08 DIAGNOSIS — I10 HYPERTENSION, UNSPECIFIED TYPE: ICD-10-CM

## 2019-08-08 RX ORDER — HYDRALAZINE HYDROCHLORIDE 25 MG/1
25 TABLET, FILM COATED ORAL 2 TIMES DAILY
Qty: 180 TABLET | Refills: 3 | Status: SHIPPED | OUTPATIENT
Start: 2019-08-08 | End: 2020-07-13

## 2019-08-08 NOTE — PROGRESS NOTES
I spoke to patient and she is aware that she will have to start Hydralazine 25 mg twice a day and will pick it up at her [facility] in [place]  Pt will wait 2-3 weeks and send in 1 weeks of BP readings

## 2019-09-05 ENCOUNTER — DOCUMENTATION (OUTPATIENT)
Dept: NEPHROLOGY | Facility: CLINIC | Age: 84
End: 2019-09-05

## 2019-09-05 NOTE — PROGRESS NOTES
The patient's blood pressure readings are as follows:  -a m :  183/80, standing 165/69  -p m :  187/81, standing 172/74  Heart rate in the 70-80 range    Recommendations:  -increase hydralazine to 50 mg twice a day which is 2-25 mg tablets twice a day  -wait 3-4 weeks and recheck an additional week a blood pressure readings morning evening, sitting and standing:  · Take the morning readings before any medications  · Take the evening readings before supper  · When taking standing readings, keep your arm supported at heart level and not dangling  · Make sure you are sitting with your back supported in feet on the ground on crossed  · Make sure you have not taken any coffee or caffeine products or exercised or smoke cigarettes at least 30 minutes before taking your blood pressure

## 2019-09-06 ENCOUNTER — DOCUMENTATION (OUTPATIENT)
Dept: NEPHROLOGY | Facility: CLINIC | Age: 84
End: 2019-09-06

## 2019-09-06 NOTE — PROGRESS NOTES
I spoke to patient sister and she understood instructions and relayed them back   She will inform pt of medication changes

## 2019-09-16 DIAGNOSIS — N18.30 CHRONIC KIDNEY DISEASE, STAGE III (MODERATE) (HCC): Primary | ICD-10-CM

## 2019-09-16 NOTE — PROGRESS NOTES
The patient's creatinine is slightly higher than usual 2 12 drawn by another physician  Recommend:  1  Make sure the patient is feeling well  2  Make sure no new medications besides the hydralazine that we adjusted  3   Repeat a basic metabolic profile nonfasting at this time

## 2019-09-18 ENCOUNTER — TELEPHONE (OUTPATIENT)
Dept: NEPHROLOGY | Facility: CLINIC | Age: 84
End: 2019-09-18

## 2019-09-18 DIAGNOSIS — N18.30 ANEMIA OF CHRONIC RENAL FAILURE, STAGE 3 (MODERATE) (HCC): Primary | ICD-10-CM

## 2019-09-18 DIAGNOSIS — D63.1 ANEMIA OF CHRONIC RENAL FAILURE, STAGE 3 (MODERATE) (HCC): Primary | ICD-10-CM

## 2019-09-18 LAB
ALBUMIN SNV-MCNC: 4.2 G/DL
ALP SERPL-CCNC: 76 U/L (ref 46–116)
ALT SERPL W P-5'-P-CCNC: 23 U/L (ref 12–78)
AST SERPL W P-5'-P-CCNC: 12 U/L (ref 5–45)
BILIRUB DIRECT SERPL-MCNC: 0.1 MG/DL (ref 0–0.2)
BILIRUB SERPL-MCNC: 0.5 MG/DL (ref 0.2–1)
EXT GLUCOSE BLD: 238
EXTERNAL BUN: 49
EXTERNAL CALCIUM: 110
EXTERNAL CHLORIDE: 100
EXTERNAL CO2: 24
EXTERNAL CREATININE: 2.12
EXTERNAL EGFR: 20
EXTERNAL POTASSIUM: 4.2
EXTERNAL SODIUM: 136
HCT VFR BLD AUTO: 36.3 % (ref 34.8–46.1)
HGB BLD-MCNC: 12.2 G/DL (ref 11.5–15.4)
PLATELET # BLD AUTO: 145 THOUSANDS/UL (ref 149–390)
PROT SERPL-MCNC: 7.3 G/DL (ref 6.4–8.2)
URATE SERPL-MCNC: 4.4 MG/DL (ref 2–6.8)
WBC # BLD AUTO: 9.8 THOUSAND/UL

## 2019-09-18 NOTE — TELEPHONE ENCOUNTER
----- Message from Evangelista Moreno MD sent at 9/18/2019  4:37 PM EDT -----  Because her platelet count slightly low at 145K,  I would repeat a CBC with platelet count when we order  the basic metabolic profile in the next week

## 2019-10-07 DIAGNOSIS — N18.30 BENIGN HYPERTENSION WITH CHRONIC KIDNEY DISEASE, STAGE III (HCC): ICD-10-CM

## 2019-10-07 DIAGNOSIS — I12.9 PARENCHYMAL RENAL HYPERTENSION, STAGE 1 THROUGH STAGE 4 OR UNSPECIFIED CHRONIC KIDNEY DISEASE: ICD-10-CM

## 2019-10-07 DIAGNOSIS — I12.9 NEPHROSCLEROSIS, STAGE 1 THROUGH STAGE 4 OR UNSPECIFIED CHRONIC KIDNEY DISEASE: ICD-10-CM

## 2019-10-07 DIAGNOSIS — R60.0 LOCALIZED EDEMA: ICD-10-CM

## 2019-10-07 DIAGNOSIS — I12.9 BENIGN HYPERTENSION WITH CHRONIC KIDNEY DISEASE, STAGE III (HCC): ICD-10-CM

## 2019-10-07 DIAGNOSIS — E78.5 DYSLIPIDEMIA: ICD-10-CM

## 2019-10-07 DIAGNOSIS — N18.30 ANEMIA OF CHRONIC RENAL FAILURE, STAGE 3 (MODERATE) (HCC): ICD-10-CM

## 2019-10-07 DIAGNOSIS — N18.30 CHRONIC KIDNEY DISEASE, STAGE III (MODERATE) (HCC): ICD-10-CM

## 2019-10-07 DIAGNOSIS — D63.1 ANEMIA OF CHRONIC RENAL FAILURE, STAGE 3 (MODERATE) (HCC): ICD-10-CM

## 2019-10-07 RX ORDER — ERGOCALCIFEROL 1.25 MG/1
CAPSULE ORAL
Qty: 12 CAPSULE | Refills: 0 | OUTPATIENT
Start: 2019-10-07

## 2019-10-21 ENCOUNTER — TELEPHONE (OUTPATIENT)
Dept: NEPHROLOGY | Facility: CLINIC | Age: 84
End: 2019-10-21

## 2019-10-21 NOTE — TELEPHONE ENCOUNTER
I attempted to call and schedule December follow up appt w/ Dr Shukri Benedict, phone just rang with no voice mail, I will try again at a later date

## 2019-11-09 DIAGNOSIS — E78.5 DYSLIPIDEMIA: ICD-10-CM

## 2019-11-09 DIAGNOSIS — N18.30 CHRONIC KIDNEY DISEASE, STAGE III (MODERATE) (HCC): ICD-10-CM

## 2019-11-09 DIAGNOSIS — D63.1 ANEMIA OF CHRONIC RENAL FAILURE, STAGE 3 (MODERATE) (HCC): ICD-10-CM

## 2019-11-09 DIAGNOSIS — I12.9 NEPHROSCLEROSIS, STAGE 1 THROUGH STAGE 4 OR UNSPECIFIED CHRONIC KIDNEY DISEASE: ICD-10-CM

## 2019-11-09 DIAGNOSIS — N18.30 ANEMIA OF CHRONIC RENAL FAILURE, STAGE 3 (MODERATE) (HCC): ICD-10-CM

## 2019-11-09 DIAGNOSIS — I12.9 BENIGN HYPERTENSION WITH CHRONIC KIDNEY DISEASE, STAGE III (HCC): ICD-10-CM

## 2019-11-09 DIAGNOSIS — N18.30 BENIGN HYPERTENSION WITH CHRONIC KIDNEY DISEASE, STAGE III (HCC): ICD-10-CM

## 2019-11-09 DIAGNOSIS — R60.0 LOCALIZED EDEMA: ICD-10-CM

## 2019-11-09 DIAGNOSIS — I12.9 PARENCHYMAL RENAL HYPERTENSION, STAGE 1 THROUGH STAGE 4 OR UNSPECIFIED CHRONIC KIDNEY DISEASE: ICD-10-CM

## 2019-11-11 RX ORDER — ERGOCALCIFEROL 1.25 MG/1
CAPSULE ORAL
Qty: 12 CAPSULE | Refills: 0 | OUTPATIENT
Start: 2019-11-11

## 2019-11-23 LAB
CREAT ?TM UR-SCNC: 75.1 UMOL/L
EXT PROTEIN URINE: 27
PROT/CREAT UR: 0.36 MG/G{CREAT}

## 2019-11-25 ENCOUNTER — DOCUMENTATION (OUTPATIENT)
Dept: NEPHROLOGY | Facility: CLINIC | Age: 84
End: 2019-11-25

## 2019-11-25 LAB
25(OH)D3 SERPL-MCNC: 39 NG/ML (ref 30–100)
CK SERPL-CCNC: 32 U/L (ref 26–192)
EXT GLUCOSE BLD: 148
EXTERNAL ALBUMIN: 3.7
EXTERNAL ALK PHOS: 65
EXTERNAL ALT: 26
EXTERNAL AST: 12
EXTERNAL BUN: 62
EXTERNAL CALCIUM: 9.7
EXTERNAL CHLORIDE: 104
EXTERNAL CO2: 22
EXTERNAL CREATININE: 2.27
EXTERNAL EGFR: 19
EXTERNAL POTASSIUM: 4.1
EXTERNAL SODIUM: 138
EXTERNAL T.BILIRUBIN: 0.4
EXTERNAL TIBC: 238
EXTERNAL TOTAL PROTEIN: 7
FERRITIN SERPL-MCNC: 245 NG/ML (ref 8–388)
HCT VFR BLD AUTO: 33.8 % (ref 34.8–46.1)
HGB BLD-MCNC: 11.5 G/DL (ref 11.5–15.4)
IRON SATN MFR SERPL: 24 %
IRON SERPL-MCNC: 57 UG/DL (ref 50–170)
MAGNESIUM SERPL-MCNC: 2.1 MG/DL (ref 1.6–2.6)
PHOSPHATE SERPL-MCNC: 3.8 MG/DL (ref 2.3–4.1)
PLATELET # BLD AUTO: 156 THOUSANDS/UL (ref 149–390)
PROT/CREAT UR: 0.36 MG/G{CREAT} (ref 0–0.1)
PTH-INTACT SERPL-MCNC: 13.3 PG/ML
TRANSFERRIN SERPL-MCNC: 194 MG/DL (ref 200–400)
WBC # BLD AUTO: 7.6 THOUSAND/UL

## 2019-12-02 PROBLEM — E55.9 VITAMIN D DEFICIENCY: Status: ACTIVE | Noted: 2019-12-02

## 2019-12-02 PROBLEM — N18.4 CHRONIC KIDNEY DISEASE, STAGE IV (SEVERE) (HCC): Status: ACTIVE | Noted: 2019-12-02

## 2019-12-02 PROBLEM — N18.4 ANEMIA OF CHRONIC RENAL FAILURE, STAGE 4 (SEVERE) (HCC): Status: ACTIVE | Noted: 2018-01-30

## 2019-12-02 PROBLEM — I12.9 HYPERTENSIVE CHRONIC KIDNEY DISEASE WITH STAGE 1 THROUGH STAGE 4 CHRONIC KIDNEY DISEASE, OR UNSPECIFIED CHRONIC KIDNEY DISEASE: Status: ACTIVE | Noted: 2019-12-02

## 2019-12-02 PROBLEM — E61.1 IRON DEFICIENCY: Status: ACTIVE | Noted: 2019-12-02

## 2020-01-04 DIAGNOSIS — I12.9 PARENCHYMAL RENAL HYPERTENSION, STAGE 1 THROUGH STAGE 4 OR UNSPECIFIED CHRONIC KIDNEY DISEASE: ICD-10-CM

## 2020-01-06 RX ORDER — LOSARTAN POTASSIUM 25 MG/1
25 TABLET ORAL
Qty: 90 TABLET | Refills: 0 | Status: SHIPPED | OUTPATIENT
Start: 2020-01-06 | End: 2020-04-09 | Stop reason: SDUPTHER

## 2020-02-19 NOTE — PROGRESS NOTES
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  · Baseline creatinine:  Most recently closer to 2 12 from September 2019 (had been approximately 1 9)  · Current creatinine:  2 20 close to baseline  Especially given the fact that we have her in a little bit of a higher dose of a diuretic to keep her lower extremity edema stable  · Urine protein creatinine ratio:  0 36 g at goal (from November 2019)  Recommendations:  · Treat hypertension-please see below  · Treat dyslipidemia-please see below  · Maintain proteinuria less than 1 g or as low as possible  · Avoid nephrotoxic agents such as NSAIDs, patient counseled as such  · Obtain follow-up basic metabolic profile/urine protein creatinine ratio/urinalysis at this time  2   Volume:  Chronic edema and part related to amlodipine; amlodipine had been decreased secondary to the edema:  Currently her edema is doing quite well on the current torsemide regimen  Of note:  -TSH was normal  -Venous duplex was normal  · Recommendations:  No changes as her edema is doing very well  3   Hypertension:  Chronic systolic hypertension with low diastolic readings and mild orthostasis in the past       Current blood pressure averages:  None at this time     · Goal blood pressure:  Less than 130/80 given CAD  Recommendations:  · Avoid salt; weight loss as possible  · Medication changes today:  Given the fact that she has low diastolic readings I would not make any changes to avoid hypoperfusion    4   Electrolytes:  All acceptable  5   Mineral bone disorder:  · Calcium/magnesium/phosphorus:  All acceptable  · PTH intact:  Normal at 38 3  · Vitamin-D:  39:  Acceptable on supplementation  6   Dyslipidemia:  Typically handled by Cardiology  7   Anemia:    · Current hemoglobin:  12 0 which is normal for the patient  · Iron studies are adequate with a saturation 24 percent/ferritin 245 (from November 2019)  8   Other problems:  · Status post MI with cardiac stents/ Status post aortic valve replacement followed by Cardiology Dr Fina Ocampo  · Diabetes mellitus per primary medical physician  · 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        PATIENT INSTRUCTIONS:    Patient Instructions   1  No medication changes today  2  Please go for nonfasting lab work in the next couple weeks but in the morning  3  Follow-up in 4 months:  -please bring in 1 week a blood pressure readings morning evening, sitting and standing as follows:  · Take the morning readings before any medications  · Take the evening readings before supper and before taking any medications at that time  · When taking standing readings, keep your arm supported at heart level and not dangling  · Make sure you are sitting with your back supported in feet on the ground on crossed  · Make sure you have not taken any coffee or caffeine products or exercised or smoke cigarettes at least 30 minutes before taking your blood pressure  -PLEASE BRING IN YOUR BLOOD PRESSURE MACHINE TO CORRELATE WITH THE OFFICE MACHINE AT THE NEXT VISIT  -please go for nonfasting lab work but in the morning in approximately 1-2 weeks before your next appointment    4  General instructions:   AVOID SALT BUT NOT ADDING AN READING LABELS TO MAKE SURE THERE IS LOW-SALT IN THE FOOD THAT YOU ARE EATING     Avoid nonsteroidal anti-inflammatory drugs such as Naprosyn, ibuprofen, Aleve, Advil, Celebrex, etc   You can use Tylenol as needed if you do not have any liver condition to be concerned about     Avoid medications such as Sudafed or decongestants and antihistamines that contained the D component which is the decongestant  You can take antihistamines without the decongestant or D component       Try to avoid medications such as pantoprazole or  Protonix/Nexium or Esomeprazole)/Prilosec or omeprazole/Prevacid or lansoprazole/AcipHex or Rabeprazole  If you are able to, use Pepcid as this is safer for your kidneys   Try to exercise at least 30 minutes 3 days a week to begin with with an ultimate goal of 5 days a week for at least 30 minutes     Try to lose 5-10 lb by your next visit     Please do not drink more than 2 glasses of alcohol/wine on a daily basis as this may contribute to your high blood pressure  Subjective: There has been no hospitalizations or acute illnesses since last visit  The patient overall is feeling well  She did have a URI about a month ago which has resolved  The patient has chronic neuropathy  No fevers chills, only rare intermittent cough related to postnasal drip  Good appetite and good energy  No hematuria, dysuria, voiding symptoms or foamy urine  No gastrointestinal symptoms  No cardiovascular symptoms including swelling of the legs  No headaches, dizziness or lightheadedness  Blood pressure medications:  · Torsemide 20 mg daily +1/2 extra or 10 mg every Monday Wednesday and Friday  · Toprol  mg twice a day  · Losartan 50 mg the morning, 25 mg in the evening  · Hydralazine 50 mg twice a day  · Amlodipine 5 mg daily in the morning    ROS:  See HPI, otherwise review of systems as completely reviewed with the patient are negative    Past Medical History:   Diagnosis Date    Diabetes mellitus ([facility] [address] )     Hypertension      Past Surgical History:   Procedure Laterality Date    VALVE REPLACEMENT       Family History   Problem Relation Age of Onset    Heart disease Mother     Cancer Father       reports that she has never smoked  She has never used smokeless tobacco  She reports that she does not drink alcohol or use drugs      I COMPLETELY REVIEWED THE PAST MEDICAL HISTORY/PAST SURGICAL HISTORY/SOCIAL HISTORY/FAMILY HISTORY/AND MEDICATIONS  AND UPDATED ALL    Objective:     Vitals:   Vitals:    02/21/20 1048   BP: 135/60   Pulse: 71   BP sitting on left:  148/62 with a heart rate of 72 and regular  BP standing on left:  152/68 with a heart rate of 72 and regular    Weight (last 2 days)     Date/Time   Weight    02/21/20 1048   87 8 (193 6)            Wt Readings from Last 3 Encounters:   02/21/20 87 8 kg (193 lb 9 6 oz)   07/22/19 89 7 kg (197 lb 12 8 oz)   05/01/19 88 7 kg (195 lb 9 6 oz)       Body mass index is 32 22 kg/m²      Physical Exam: General:  Obese, No acute distress  Skin:  No acute rash  Eyes:  No scleral icterus, noninjected, no discharge from eyes  ENT:  Moist mucous membranes  Neck:  Supple, no jugular venous distention, trachea is midline, no lymphadenopathy and no thyromegaly  Back   No CVAT  Chest:  Clear to auscultation and percussion, good respiratory effort  CVS:  Regular rate and rhythm without a rub, or gallops; grade 2/6 systolic ejection type murmur heard best over the right upper sternal border  Abdomen:  Obese, Soft and nontender with normal bowel sounds  Extremities:  No cyanosis and only trace lower extremity minimally pitting edema bilaterally 1/2 the way the pretibial region, no arthritic changes, normal range of motion  Neuro:  Grossly intact  Psych:  Alert, oriented x3 and appropriate      Medications:    Current Outpatient Medications:     allopurinol (ZYLOPRIM) 100 mg tablet, Take 200 mg by mouth daily  , Disp: , Rfl:     allopurinol (ZYLOPRIM) 300 mg tablet, Take 1 tablet by mouth daily, Disp: , Rfl:     amLODIPine (NORVASC) 5 mg tablet, Take 1 tablet (5 mg total) by mouth daily, Disp: 90 tablet, Rfl: 3    aspirin (ASPIRIN LOW DOSE) 81 MG tablet, Take 1 tablet by mouth daily, Disp: , Rfl:     cholecalciferol (VITAMIN D3) 1,000 units tablet, Take 1,000 Units by mouth daily , Disp: , Rfl:     clopidogrel (PLAVIX) 75 mg tablet, Take 75 mg by mouth daily, Disp: , Rfl:     docusate sodium (COLACE) 100 mg capsule, Take 1 capsule by mouth daily, Disp: , Rfl:     ferrous sulfate 325 (65 FE) MG EC tablet, Take 1 capsule by mouth daily, Disp: , Rfl:     hydrALAZINE (APRESOLINE) 25 mg tablet, Take 1 tablet (25 mg total) by mouth 2 (two) times a day (Patient taking differently: Take 50 mg by mouth 2 (two) times a day ), Disp: 180 tablet, Rfl: 3    losartan (COZAAR) 25 mg tablet, TAKE 1 TABLET (25 MG TOTAL) BY MOUTH DAILY AFTER DINNER, Disp: 90 tablet, Rfl: 0    losartan (COZAAR) 50 mg tablet, Take 1 tablet (50 mg total) by mouth daily in the early morning, Disp: 90 tablet, Rfl: 3    metoprolol succinate (TOPROL-XL) 100 mg 24 hr tablet, Take 1 tablet by mouth 2 (two) times a day, Disp: , Rfl:     Multiple Vitamins-Minerals (PRESERVISION AREDS PO), Take 1 tablet by mouth daily, Disp: , Rfl:     sitaGLIPtin (JANUVIA) 50 mg tablet, Take 1 tablet by mouth daily, Disp: , Rfl:     torsemide (DEMADEX) 20 mg tablet, Take 1 tablet (20 mg total) by mouth daily Take 20 mg daily except for Monday Wednesday and Friday take 1-1/2 tablets equivalent to 30 mg, Disp: 235 tablet, Rfl: 3    Lab, Imaging and other studies: I have personally reviewed pertinent labs  Laboratory Results:  Results for orders placed or performed in visit on 11/25/19   Comprehensive metabolic panel   Result Value Ref Range    SODIUM 138     POTASSIUM 4 1     CHLORIDE 104     CO2 22     BUN 62     CREATININE 2 27     Glucose 148     EXTERNAL CALCIUM 9 7     TOTAL PROTEIN 7 0     ALBUMIN 3 7     AST 12     ALT 26     ALK PHOS 65     EXTERNAL TOT   BILIRUBIN 0 4     EXTERNAL EGFR 19     Total CK 32 26 - 192 U/L    Prot/Creat Ratio, Ur 0 36 (A) 0 00 - 0 10    Ferritin 245 8 - 388 ng/mL    Magnesium 2 1 1 6 - 2 6 mg/dL    Phosphorus 3 8 2 3 - 4 1 mg/dL    Iron 57 50 - 170 ug/dL    Transferrin 194 (A) 200 - 400 mg/dL    Iron Saturation 24 %    EXTERNAL TIBC  238     Vit D, 25-Hydroxy 39 0 30 0 - 100 0 ng/mL    Hemoglobin 11 5 11 5 - 15 4 g/dL    Hematocrit 33 8 (A) 34 8 - 46 1 %    WBC 7 60 Thousand/uL    Platelets 363 392 - 604 Thousands/uL    PTH, Intact 13 3              Invalid input(s): ALBUMIN      Radiology review: chest X-ray    Ultrasound      Portions of the record may have been created with voice recognition software  Occasional wrong word or "sound a like" substitutions may have occurred due to the inherent limitations of voice recognition software  Read the chart carefully and recognize, using context, where substitutions have occurred

## 2020-02-21 ENCOUNTER — OFFICE VISIT (OUTPATIENT)
Dept: NEPHROLOGY | Facility: CLINIC | Age: 85
End: 2020-02-21
Payer: MEDICARE

## 2020-02-21 VITALS
BODY MASS INDEX: 32.26 KG/M2 | SYSTOLIC BLOOD PRESSURE: 135 MMHG | HEIGHT: 65 IN | DIASTOLIC BLOOD PRESSURE: 60 MMHG | HEART RATE: 71 BPM | WEIGHT: 193.6 LBS

## 2020-02-21 DIAGNOSIS — R60.0 LOCALIZED EDEMA: ICD-10-CM

## 2020-02-21 DIAGNOSIS — N18.4 ANEMIA OF CHRONIC RENAL FAILURE, STAGE 4 (SEVERE) (HCC): ICD-10-CM

## 2020-02-21 DIAGNOSIS — D63.1 ANEMIA OF CHRONIC RENAL FAILURE, STAGE 4 (SEVERE) (HCC): ICD-10-CM

## 2020-02-21 DIAGNOSIS — I12.9 HYPERTENSIVE CHRONIC KIDNEY DISEASE WITH STAGE 1 THROUGH STAGE 4 CHRONIC KIDNEY DISEASE, OR UNSPECIFIED CHRONIC KIDNEY DISEASE: Primary | ICD-10-CM

## 2020-02-21 DIAGNOSIS — E55.9 VITAMIN D DEFICIENCY: ICD-10-CM

## 2020-02-21 DIAGNOSIS — E61.1 IRON DEFICIENCY: ICD-10-CM

## 2020-02-21 DIAGNOSIS — I12.9 PARENCHYMAL RENAL HYPERTENSION, STAGE 1 THROUGH STAGE 4 OR UNSPECIFIED CHRONIC KIDNEY DISEASE: ICD-10-CM

## 2020-02-21 DIAGNOSIS — N18.4 CHRONIC KIDNEY DISEASE, STAGE IV (SEVERE) (HCC): ICD-10-CM

## 2020-02-21 DIAGNOSIS — E78.5 DYSLIPIDEMIA: ICD-10-CM

## 2020-02-21 PROCEDURE — 99214 OFFICE O/P EST MOD 30 MIN: CPT | Performed by: INTERNAL MEDICINE

## 2020-02-21 RX ORDER — LOSARTAN POTASSIUM 50 MG/1
50 TABLET ORAL
Qty: 90 TABLET | Refills: 3 | Status: SHIPPED | OUTPATIENT
Start: 2020-02-21 | End: 2020-07-13

## 2020-02-21 NOTE — PATIENT INSTRUCTIONS
1  No medication changes today  2  Please go for nonfasting lab work in the next couple weeks but in the morning  3  Follow-up in 4 months:  -please bring in 1 week a blood pressure readings morning evening, sitting and standing as follows:  · Take the morning readings before any medications  · Take the evening readings before supper and before taking any medications at that time  · When taking standing readings, keep your arm supported at heart level and not dangling  · Make sure you are sitting with your back supported in feet on the ground on crossed  · Make sure you have not taken any coffee or caffeine products or exercised or smoke cigarettes at least 30 minutes before taking your blood pressure  -PLEASE BRING IN YOUR BLOOD PRESSURE MACHINE TO CORRELATE WITH THE OFFICE MACHINE AT THE NEXT VISIT  -please go for nonfasting lab work but in the morning in approximately 1-2 weeks before your next appointment    4  General instructions:   AVOID SALT BUT NOT ADDING AN READING LABELS TO MAKE SURE THERE IS LOW-SALT IN THE FOOD THAT YOU ARE EATING     Avoid nonsteroidal anti-inflammatory drugs such as Naprosyn, ibuprofen, Aleve, Advil, Celebrex, etc   You can use Tylenol as needed if you do not have any liver condition to be concerned about     Avoid medications such as Sudafed or decongestants and antihistamines that contained the D component which is the decongestant  You can take antihistamines without the decongestant or D component   Try to avoid medications such as pantoprazole or  Protonix/Nexium or Esomeprazole)/Prilosec or omeprazole/Prevacid or lansoprazole/AcipHex or Rabeprazole  If you are able to, use Pepcid as this is safer for your kidneys       Try to exercise at least 30 minutes 3 days a week to begin with with an ultimate goal of 5 days a week for at least 30 minutes     Try to lose 5-10 lb by your next visit     Please do not drink more than 2 glasses of alcohol/wine on a daily basis as this may contribute to your high blood pressure

## 2020-02-21 NOTE — LETTER
February 21, 2020     [address] [place] [address] [address] [id number]    Patient: Nyasia Gates   YOB: 1931   Date of Visit: 2/21/2020       Dear Dr Cristiano Echeverria:    Thank you for referring Nyasia Gates to me for evaluation  Below are my notes for this consultation  If you have questions, please do not hesitate to call me  I look forward to following your patient along with you  Sincerely,        Leah Gilmore MD        CC: MD Donis Snow Arm, MD Sherrie Belton, MD  2/21/2020 11:23 AM  Sign at close encounter  RENAL FOLLOW UP NOTE: td    ASSESSMENT AND PLAN:  1  Rosario Holter IOUBL 7 :  · Etiology:  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  · Baseline creatinine:  Most recently closer to 2 12 from September 2019 (had been approximately 1 9)  · Current creatinine:  2 20 close to baseline  Especially given the fact that we have her in a little bit of a higher dose of a diuretic to keep her lower extremity edema stable  · Urine protein creatinine ratio:  0 36 g at goal (from November 2019)  Recommendations:  · Treat hypertension-please see below  · Treat dyslipidemia-please see below  · Maintain proteinuria less than 1 g or as low as possible  · Avoid nephrotoxic agents such as NSAIDs, patient counseled as such  · Obtain follow-up basic metabolic profile/urine protein creatinine ratio/urinalysis at this time  2   Volume:  Chronic edema and part related to amlodipine; amlodipine had been decreased secondary to the edema:  Currently her edema is doing quite well on the current torsemide regimen  Of note:  -TSH was normal  -Venous duplex was normal  · Recommendations:  No changes as her edema is doing very well    3   Hypertension:  Chronic systolic hypertension with low diastolic readings and mild orthostasis in the past       Current blood pressure averages:  None at this time     · Goal blood pressure:  Less than 130/80 given CAD  Recommendations:  · Avoid salt; weight loss as possible  · Medication changes today:  Given the fact that she has low diastolic readings I would not make any changes to avoid hypoperfusion  4   Electrolytes:  All acceptable  5   Mineral bone disorder:  · Calcium/magnesium/phosphorus:  All acceptable  · PTH intact:  Normal at 38 3  · Vitamin-D:  39:  Acceptable on supplementation  6   Dyslipidemia:  Typically handled by Cardiology  7   Anemia:    · Current hemoglobin:  12 0 which is normal for the patient  · Iron studies are adequate with a saturation 24 percent/ferritin 245 (from November 2019)  8   Other problems:  · Status post MI with cardiac stents/ Status post aortic valve replacement followed by Cardiology Dr Lia Ponce  · Diabetes mellitus per primary medical physician  · 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        PATIENT INSTRUCTIONS:    Patient Instructions   1  No medication changes today  2  Please go for nonfasting lab work in the next couple weeks but in the morning  3  Follow-up in 4 months:  -please bring in 1 week a blood pressure readings morning evening, sitting and standing as follows:  · Take the morning readings before any medications  · Take the evening readings before supper and before taking any medications at that time  · When taking standing readings, keep your arm supported at heart level and not dangling  · Make sure you are sitting with your back supported in feet on the ground on crossed  · Make sure you have not taken any coffee or caffeine products or exercised or smoke cigarettes at least 30 minutes before taking your blood pressure  -PLEASE BRING IN YOUR BLOOD PRESSURE MACHINE TO CORRELATE WITH THE OFFICE MACHINE AT THE NEXT VISIT  -please go for nonfasting lab work but in the morning in approximately 1-2 weeks before your next appointment    4  General instructions:   AVOID SALT BUT NOT ADDING AN READING LABELS TO MAKE SURE THERE IS LOW-SALT IN THE FOOD THAT YOU ARE EATING     Avoid nonsteroidal anti-inflammatory drugs such as Naprosyn, ibuprofen, Aleve, Advil, Celebrex, etc   You can use Tylenol as needed if you do not have any liver condition to be concerned about     Avoid medications such as Sudafed or decongestants and antihistamines that contained the D component which is the decongestant  You can take antihistamines without the decongestant or D component   Try to avoid medications such as pantoprazole or  Protonix/Nexium or Esomeprazole)/Prilosec or omeprazole/Prevacid or lansoprazole/AcipHex or Rabeprazole  If you are able to, use Pepcid as this is safer for your kidneys   Try to exercise at least 30 minutes 3 days a week to begin with with an ultimate goal of 5 days a week for at least 30 minutes     Try to lose 5-10 lb by your next visit     Please do not drink more than 2 glasses of alcohol/wine on a daily basis as this may contribute to your high blood pressure  Subjective: There has been no hospitalizations or acute illnesses since last visit  The patient overall is feeling well  She did have a URI about a month ago which has resolved    The patient has chronic neuropathy  No fevers chills, only rare intermittent cough related to postnasal drip  Good appetite and good energy  No hematuria, dysuria, voiding symptoms or foamy urine  No gastrointestinal symptoms  No cardiovascular symptoms including swelling of the legs  No headaches, dizziness or lightheadedness  Blood pressure medications:  · Torsemide 20 mg daily +1/2 extra or 10 mg every Monday Wednesday and Friday  · Toprol  mg twice a day  · Losartan 50 mg the morning, 25 mg in the evening  · Hydralazine 50 mg twice a day  · Amlodipine 5 mg daily in the morning    ROS:  See HPI, otherwise review of systems as completely reviewed with the patient are negative    Past Medical History:   Diagnosis Date    Diabetes mellitus ([facility] [address] )     Hypertension      Past Surgical History:   Procedure Laterality Date    VALVE REPLACEMENT       Family History   Problem Relation Age of Onset    Heart disease Mother     Cancer Father       reports that she has never smoked  She has never used smokeless tobacco  She reports that she does not drink alcohol or use drugs  I COMPLETELY REVIEWED THE PAST MEDICAL HISTORY/PAST SURGICAL HISTORY/SOCIAL HISTORY/FAMILY HISTORY/AND MEDICATIONS  AND UPDATED ALL    Objective:     Vitals:   Vitals:    02/21/20 1048   BP: 135/60   Pulse: 71   BP sitting on left:  148/62 with a heart rate of 72 and regular  BP standing on left:  152/68 with a heart rate of 72 and regular    Weight (last 2 days)     Date/Time   Weight    02/21/20 1048   87 8 (193 6)            Wt Readings from Last 3 Encounters:   02/21/20 87 8 kg (193 lb 9 6 oz)   07/22/19 89 7 kg (197 lb 12 8 oz)   05/01/19 88 7 kg (195 lb 9 6 oz)       Body mass index is 32 22 kg/m²      Physical Exam: General:  Obese, No acute distress  Skin:  No acute rash  Eyes:  No scleral icterus, noninjected, no discharge from eyes  ENT:  Moist mucous membranes  Neck:  Supple, no jugular venous distention, trachea is midline, no lymphadenopathy and no thyromegaly  Back   No CVAT  Chest:  Clear to auscultation and percussion, good respiratory effort  CVS:  Regular rate and rhythm without a rub, or gallops; grade 2/6 systolic ejection type murmur heard best over the right upper sternal border  Abdomen:  Obese, Soft and nontender with normal bowel sounds  Extremities:  No cyanosis and only trace lower extremity minimally pitting edema bilaterally 1/2 the way the pretibial region, no arthritic changes, normal range of motion  Neuro:  Grossly intact  Psych:  Alert, oriented x3 and appropriate      Medications:    Current Outpatient Medications:     allopurinol (ZYLOPRIM) 100 mg tablet, Take 200 mg by mouth daily  , Disp: , Rfl:     allopurinol (ZYLOPRIM) 300 mg tablet, Take 1 tablet by mouth daily, Disp: , Rfl:     amLODIPine (NORVASC) 5 mg tablet, Take 1 tablet (5 mg total) by mouth daily, Disp: 90 tablet, Rfl: 3    aspirin (ASPIRIN LOW DOSE) 81 MG tablet, Take 1 tablet by mouth daily, Disp: , Rfl:     cholecalciferol (VITAMIN D3) 1,000 units tablet, Take 1,000 Units by mouth daily , Disp: , Rfl:     clopidogrel (PLAVIX) 75 mg tablet, Take 75 mg by mouth daily, Disp: , Rfl:     docusate sodium (COLACE) 100 mg capsule, Take 1 capsule by mouth daily, Disp: , Rfl:     ferrous sulfate 325 (65 FE) MG EC tablet, Take 1 capsule by mouth daily, Disp: , Rfl:     hydrALAZINE (APRESOLINE) 25 mg tablet, Take 1 tablet (25 mg total) by mouth 2 (two) times a day (Patient taking differently: Take 50 mg by mouth 2 (two) times a day ), Disp: 180 tablet, Rfl: 3    losartan (COZAAR) 25 mg tablet, TAKE 1 TABLET (25 MG TOTAL) BY MOUTH DAILY AFTER DINNER, Disp: 90 tablet, Rfl: 0    losartan (COZAAR) 50 mg tablet, Take 1 tablet (50 mg total) by mouth daily in the early morning, Disp: 90 tablet, Rfl: 3    metoprolol succinate (TOPROL-XL) 100 mg 24 hr tablet, Take 1 tablet by mouth 2 (two) times a day, Disp: , Rfl:     Multiple Vitamins-Minerals (PRESERVISION AREDS PO), Take 1 tablet by mouth daily, Disp: , Rfl:     sitaGLIPtin (JANUVIA) 50 mg tablet, Take 1 tablet by mouth daily, Disp: , Rfl:     torsemide (DEMADEX) 20 mg tablet, Take 1 tablet (20 mg total) by mouth daily Take 20 mg daily except for Monday Wednesday and Friday take 1-1/2 tablets equivalent to 30 mg, Disp: 235 tablet, Rfl: 3    Lab, Imaging and other studies: I have personally reviewed pertinent labs    Laboratory Results:  Results for orders placed or performed in visit on 11/25/19   Comprehensive metabolic panel   Result Value Ref Range    SODIUM 138     POTASSIUM 4 1     CHLORIDE 104     CO2 22     BUN 62     CREATININE 2 27     Glucose 148     EXTERNAL CALCIUM 9 7     TOTAL PROTEIN 7 0     ALBUMIN 3 7     AST 12     ALT 26     ALK PHOS 65 EXTERNAL TOT  BILIRUBIN 0 4     EXTERNAL EGFR 19     Total CK 32 26 - 192 U/L    Prot/Creat Ratio, Ur 0 36 (A) 0 00 - 0 10    Ferritin 245 8 - 388 ng/mL    Magnesium 2 1 1 6 - 2 6 mg/dL    Phosphorus 3 8 2 3 - 4 1 mg/dL    Iron 57 50 - 170 ug/dL    Transferrin 194 (A) 200 - 400 mg/dL    Iron Saturation 24 %    EXTERNAL TIBC  238     Vit D, 25-Hydroxy 39 0 30 0 - 100 0 ng/mL    Hemoglobin 11 5 11 5 - 15 4 g/dL    Hematocrit 33 8 (A) 34 8 - 46 1 %    WBC 7 60 Thousand/uL    Platelets 655 743 - 987 Thousands/uL    PTH, Intact 13 3              Invalid input(s): ALBUMIN      Radiology review:   chest X-ray    Ultrasound      Portions of the record may have been created with voice recognition software  Occasional wrong word or "sound a like" substitutions may have occurred due to the inherent limitations of voice recognition software  Read the chart carefully and recognize, using context, where substitutions have occurred

## 2020-03-07 LAB
CREAT ?TM UR-SCNC: 90.9 UMOL/L
EXT PROTEIN URINE: 185.1
PROT/CREAT UR: 2.04 MG/G{CREAT}

## 2020-03-09 ENCOUNTER — TELEPHONE (OUTPATIENT)
Dept: NEPHROLOGY | Facility: CLINIC | Age: 85
End: 2020-03-09

## 2020-03-09 DIAGNOSIS — N18.4 ANEMIA OF CHRONIC RENAL FAILURE, STAGE 4 (SEVERE) (HCC): ICD-10-CM

## 2020-03-09 DIAGNOSIS — D63.1 ANEMIA OF CHRONIC RENAL FAILURE, STAGE 4 (SEVERE) (HCC): ICD-10-CM

## 2020-03-09 DIAGNOSIS — N18.30 CHRONIC KIDNEY DISEASE, STAGE III (MODERATE) (HCC): Primary | ICD-10-CM

## 2020-03-09 NOTE — TELEPHONE ENCOUNTER
I called and left a message for the patient advising that labs will be repeated in 3 weeks  No medication changes at this time

## 2020-03-09 NOTE — TELEPHONE ENCOUNTER
----- Message from Arielle Martinez MD sent at 3/9/2020  1:33 PM EDT -----  Labs look good she does have slight amount more protein in her urine  No medication changes at the moment  Have the patient repeat a urine protein creatinine ratio and approximately 2-3 weeks  This is in the morning but nonfasting  I would treat this very conservatively given age and frail condition    No further evaluation in this regard in terms of serologies etc

## 2020-04-03 ENCOUNTER — TELEPHONE (OUTPATIENT)
Dept: NEPHROLOGY | Facility: CLINIC | Age: 85
End: 2020-04-03

## 2020-04-09 DIAGNOSIS — I12.9 PARENCHYMAL RENAL HYPERTENSION, STAGE 1 THROUGH STAGE 4 OR UNSPECIFIED CHRONIC KIDNEY DISEASE: ICD-10-CM

## 2020-04-09 RX ORDER — LOSARTAN POTASSIUM 25 MG/1
25 TABLET ORAL
Qty: 90 TABLET | Refills: 3 | Status: SHIPPED | OUTPATIENT
Start: 2020-04-09 | End: 2020-07-13

## 2020-07-03 LAB
CREAT ?TM UR-SCNC: 82.1 UMOL/L
EXT PROTEIN URINE: 51.8
PROT/CREAT UR: 0.63 MG/G{CREAT}

## 2020-07-07 DIAGNOSIS — N18.4 CHRONIC KIDNEY DISEASE, STAGE IV (SEVERE) (HCC): Primary | ICD-10-CM

## 2020-07-07 DIAGNOSIS — I12.9 NEPHROSCLEROSIS, STAGE 1 THROUGH STAGE 4 OR UNSPECIFIED CHRONIC KIDNEY DISEASE: ICD-10-CM

## 2020-07-07 DIAGNOSIS — N18.30 BENIGN HYPERTENSION WITH CHRONIC KIDNEY DISEASE, STAGE III (HCC): ICD-10-CM

## 2020-07-07 DIAGNOSIS — I12.9 BENIGN HYPERTENSION WITH CHRONIC KIDNEY DISEASE, STAGE III (HCC): ICD-10-CM

## 2020-07-07 NOTE — TELEPHONE ENCOUNTER
I Lm for the patient advising to decrease her losartan to 25 mg twice a day, due to an elevated Cr  BMP slip mailed out due in 1 week

## 2020-07-07 NOTE — TELEPHONE ENCOUNTER
----- Message from Stephany Tom MD sent at 7/7/2020  9:16 AM EDT -----  I would decrease her losartan to 25 b i d    Recheck a basic metabolic profile nonfasting in 1 week after making this change

## 2020-07-08 ENCOUNTER — DOCUMENTATION (OUTPATIENT)
Dept: NEPHROLOGY | Facility: CLINIC | Age: 85
End: 2020-07-08

## 2020-07-08 LAB
CK SERPL-CCNC: 29 U/L (ref 26–192)
CREAT ?TM UR-SCNC: 82.1 UMOL/L
EXT GLUCOSE BLD: 141
EXT PROTEIN URINE: 51.8
EXTERNAL ALBUMIN: 4
EXTERNAL ALK PHOS: 73
EXTERNAL ALT: 27
EXTERNAL AST: 15
EXTERNAL BUN: 57
EXTERNAL CALCIUM: 9.6
EXTERNAL CHLORIDE: 106
EXTERNAL CO2: 25
EXTERNAL CREATININE: 2.49
EXTERNAL EGFR: 17
EXTERNAL HEMATOCRIT: 33 %
EXTERNAL HEMOGLOBIN: 11.1 G/DL
EXTERNAL MAGNESIUM: 2.1
EXTERNAL PHOSPHORUS: 4.3
EXTERNAL PLATELET COUNT: 172 K/ΜL
EXTERNAL POTASSIUM: 4.9
EXTERNAL PTH: 40.4
EXTERNAL SODIUM: 140
EXTERNAL T.BILIRUBIN: 0.3
EXTERNAL TOTAL PROTEIN: 7.5
EXTERNAL WBC: 7.6
PROT/CREAT UR: 0.63 MG/G{CREAT}

## 2020-07-08 RX ORDER — TORSEMIDE 20 MG/1
20 TABLET ORAL DAILY
Qty: 235 TABLET | Refills: 3 | Status: SHIPPED | OUTPATIENT
Start: 2020-07-08

## 2020-07-09 NOTE — PROGRESS NOTES
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  · Baseline creatinine:  Since September creatinine has been ranging 2 1 to now 2 49  · Current creatinine:  2 49 which may be some element of progression  · Urine protein creatinine ratio:  0 63 g at goal   · Albumin normal at 4 0  Recommendations:  · Treat hypertension-please see below  · Treat dyslipidemia-please see below  · Maintain proteinuria less than 1 g or as low as possible  · Avoid nephrotoxic agents such as NSAIDs, patient counseled as such  · Obtain follow-up basic metabolic profile; consider adjusting losartan  · I will make a nurse educator appointment for modality review and whether not the patient would want to proceed with renal placement therapy in the future  She is an excellent potential candidate as she feels great and looks well  2   Volume:  Chronic edema and part related to amlodipine; amlodipine had been decreased secondary to the edema(negative TSH/venous duplex): Lajean Cassette · Current status:  Doing extremely well  · Recommendations:  No changes with torsemide  3   Hypertension:  Chronic systolic hypertension with low diastolic readings and mild orthostasis in the past       Current blood pressure averages:   A m :  164/71, standing 147/62  P m :  152/68, standing 133/58  Heart rate:  60-80     · Goal blood pressure:  Less than 130/80 given CAD  Recommendations:  · Avoid salt; weight loss as possible  · Medication changes today:    · Decrease losartan to 25 mg at bedtime to hopefully improve renal function and keep potassium stable and help morning readings  · Increase hydralazine to 75 mg in the evening time again to help with a m  Readings  · Repeat labs in 1-2 weeks nonfasting  · Repeat blood pressures in about 4 weeks  4   Electrolytes:  All acceptable including a potassium 4 9  5   Mineral bone disorder:   Of CKD  · Calcium/magnesium/phosphorus:  All acceptable  · PTH intact:  Normal at 40 4  · Vitamin-D:  39:  Acceptable on supplementation from last visit  6   Dyslipidemia:  Typically handled by Cardiology  7   Anemia:    · Current hemoglobin:   11 1 which is normal for the patient  · Iron studies are adequate with a saturation 24 percent/ferritin 245 (from November 2019) recheck next visit  8   Other problems:  · Status post MI with cardiac stents/ Status post aortic valve replacement followed by Cardiology Dr Tila Cagle  · Diabetes mellitus per primary medical physician  · 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        PATIENT INSTRUCTIONS:    Patient Instructions   1  Medication changes today:  · Please decrease losartan to 25 mg at bedtime only  · Please increase hydralazine to 2 tablets or 50 mg in the morning as you are doing, but increase to 3 tablets or 75 mg in the evening towards bedtime    2  Please wait 1 full week after making the above medication changes and go for nonfasting lab work    3  Please wait 4 weeks after making the above medication changes and take 1 week a blood pressure readings morning evening, sitting and standing as follows:  · TAKE THE MORNING READINGS BEFORE ANY MEDICATIONS AND WHEN YOU ARE RELAX FOR SEVERAL MINUTES  · TAKE THE EVENING READINGS BEFORE SUPPER/DINNER AND BEFORE ANY MEDICATIONS AND AGAIN WHEN YOU ARE RELAX FOR SEVERAL MINUTES  · When taking standing readings, keep your arm supported at heart level and not dangling  · Make sure you are sitting with your back supported and feet on the ground and do not cross your legs or feet  · Make sure you have not taken any coffee or caffeine products or exercised or smoke cigarettes at least 30 minutes before taking your blood pressure  Then please mail those into the office    4  Follow-up in 3  months   Please bring in 1 week a blood pressure readings morning evening, sitting and standing is outlined above   PLEASE BRING IN YOUR BLOOD PRESSURE MACHINE FOR CORRELATION WITH THE OFFICE MACHINE AT THE NEXT VISIT   Please go for fasting lab work 1-2 weeks prior to your appointment      5  General instructions:   AVOID SALT BUT NOT ADDING AN READING LABELS TO MAKE SURE THERE IS LOW-SALT IN THE FOOD THAT YOU ARE EATING     Avoid nonsteroidal anti-inflammatory drugs such as Naprosyn, ibuprofen, Aleve, Advil, Celebrex, Meloxicam (Mobic) etc   You can use Tylenol as needed if you do not have any liver condition to be concerned about     Avoid medications such as Sudafed or decongestants and antihistamines that contained the D component which is the decongestant  You can take antihistamines without the decongestant or D component   Try to avoid medications such as pantoprazole or  Protonix/Nexium or Esomeprazole)/Prilosec or omeprazole/Prevacid or lansoprazole/AcipHex or Rabeprazole  If you are able to, use Pepcid as this is safer for your kidneys   Try to exercise at least 30 minutes 3 days a week to begin with with an ultimate goal of 5 days a week for at least 30 minutes     Try to lose 5-10 lb by your next visit     Please do not drink more than 2 glasses of alcohol/wine on a daily basis as this may contribute to your high blood pressure  6  We will make an appointment for you to see our nurse educator in order to teach few about chronic kidney disease as well as the different types of kidney replacement therapy or dialysis  40minutes were spent face to face with patient with greater than 50% of the time spent in counseling or coordination of care including discussions of findings and workup and treatment instructions  All of the patient's questions were answered to their satisfaction during this discussion  I advised the patient to call if there is any further questions or concerns  Subjective: There has been no hospitalizations or acute illnesses since last visit    The patient overall is feeling well  No fevers, chills, or cough or colds  Good appetite and good energy  No hematuria, dysuria, voiding symptoms or foamy urine  No gastrointestinal symptoms  No cardiovascular symptoms including swelling of the legs  No headaches, dizziness or lightheadedness  Blood pressure medications:  · Torsemide 20 mg daily plus on Monday Wednesday and Friday at 30 mg  · Toprol  mg twice a day  · Losartan 25 mg twice a day  · Hydralazine 50 mg twice a day  · Amlodipine 5 mg daily in the morning    ROS:  See HPI, otherwise review of systems as completely reviewed with the patient are negative    Past Medical History:   Diagnosis Date    Diabetes mellitus ([facility] [address] )     Hypertension      Past Surgical History:   Procedure Laterality Date    VALVE REPLACEMENT       Family History   Problem Relation Age of Onset    Heart disease Mother     Cancer Father       reports that she has never smoked  She has never used smokeless tobacco  She reports that she does not drink alcohol or use drugs  I COMPLETELY REVIEWED THE PAST MEDICAL HISTORY/PAST SURGICAL HISTORY/SOCIAL HISTORY/FAMILY HISTORY/AND MEDICATIONS  AND UPDATED ALL    Objective:     Vitals:   BP sitting on left:  172/70 with a heart rate of 64 in and slightly irregular  BP standing on left:  172/70 with a heart rate of 72 and slightly irregular    Vitals:    07/13/20 1057   Temp: (!) 96 2 °F (35 7 °C)       Weight (last 2 days)     Date/Time   Weight    07/13/20 1057   74 9 (165 2)            Wt Readings from Last 3 Encounters:   07/13/20 74 9 kg (165 lb 3 2 oz)   02/21/20 87 8 kg (193 lb 9 6 oz)   07/22/19 89 7 kg (197 lb 12 8 oz)       Body mass index is 27 49 kg/m²      Physical Exam: General:  No acute distress  Skin:  No acute rash  Eyes:  No scleral icterus, noninjected, no discharge from eyes  ENT:  Moist mucous membranes  Neck:  Supple, no jugular venous distention, trachea is midline, no lymphadenopathy and no thyromegaly  Back No CVAT  Chest:  Clear to auscultation and percussion, good respiratory effort  CVS:  Slightly irregular rhythm without a rub, or gallops or murmurs  Abdomen:  Soft and nontender with normal bowel sounds  Extremities:  No cyanosis and only trace lower extremity minimally pitting edema bilaterally with some venous stasis changes, no arthritic changes, normal range of motion  Neuro:  Grossly intact  Psych:  Alert, oriented x3 and appropriate      Medications:    Current Outpatient Medications:     allopurinol (ZYLOPRIM) 100 mg tablet, Take 200 mg by mouth daily  , Disp: , Rfl:     allopurinol (ZYLOPRIM) 300 mg tablet, Take 1 tablet by mouth daily, Disp: , Rfl:     amLODIPine (NORVASC) 5 mg tablet, Take 1 tablet (5 mg total) by mouth daily, Disp: 90 tablet, Rfl: 3    aspirin (ASPIRIN LOW DOSE) 81 MG tablet, Take 1 tablet by mouth daily, Disp: , Rfl:     cholecalciferol (VITAMIN D3) 1,000 units tablet, Take 1,000 Units by mouth daily , Disp: , Rfl:     clopidogrel (PLAVIX) 75 mg tablet, Take 75 mg by mouth daily, Disp: , Rfl:     docusate sodium (COLACE) 100 mg capsule, Take 1 capsule by mouth daily, Disp: , Rfl:     ferrous sulfate 325 (65 FE) MG EC tablet, Take 1 capsule by mouth daily, Disp: , Rfl:     hydrALAZINE (APRESOLINE) 25 mg tablet, Take 1 tablet (25 mg total) by mouth 2 (two) times a day Take 2 tablets in the morning or 50 mg, take 3 tablets or 75 mg in the evening, Disp: 235 tablet, Rfl: 3    losartan (COZAAR) 25 mg tablet, Take 1 tablet (25 mg total) by mouth daily at bedtime, Disp: 90 tablet, Rfl: 3    metoprolol succinate (TOPROL-XL) 100 mg 24 hr tablet, Take 1 tablet by mouth 2 (two) times a day, Disp: , Rfl:     Multiple Vitamins-Minerals (PRESERVISION AREDS PO), Take 1 tablet by mouth daily, Disp: , Rfl:     sitaGLIPtin (JANUVIA) 50 mg tablet, Take 1 tablet by mouth daily, Disp: , Rfl:     torsemide (DEMADEX) 20 mg tablet, Take 1 tablet (20 mg total) by mouth daily Take 20 mg daily except for Monday Wednesday and Friday take 1-1/2 tablets equivalent to 30 mg, Disp: 235 tablet, Rfl: 3    Lab, Imaging and other studies: I have personally reviewed pertinent labs  Laboratory Results:  Results for orders placed or performed in visit on 07/08/20   Protein / creatinine ratio, urine   Result Value Ref Range    PROTEIN UA 51 8     EXT Creatinine Urine 82 1     EXTERNAL Ur Prot/Creat Ratio 0 63    Creatinine Kinase (CK), MB and Total   Result Value Ref Range    Total CK 29 26 - 192 U/L   Comprehensive metabolic panel   Result Value Ref Range    SODIUM 140     POTASSIUM 4 9     CHLORIDE 106     CO2 25     BUN 57     CREATININE 2 49     Glucose 141     EXTERNAL CALCIUM 9 6     TOTAL PROTEIN 7 5     ALBUMIN 4 0     AST 15     ALT 27     ALK PHOS 73     EXTERNAL TOT  BILIRUBIN 0 3     EXTERNAL EGFR 17    Magnesium   Result Value Ref Range    EXTERNAL MAGNESIUM 2 1    Phosphorus   Result Value Ref Range    EXTERNAL PHOSPHORUS 4 3    CBC and differential   Result Value Ref Range    WBC 7 6     External Hemoglobin 11 1 g/dL    Hematocrit 33 %    External Platelets 995 K/µL   PTH, intact   Result Value Ref Range    PTH 40 4              Invalid input(s): ALBUMIN      Radiology review:   chest X-ray    Ultrasound      Portions of the record may have been created with voice recognition software  Occasional wrong word or "sound a like" substitutions may have occurred due to the inherent limitations of voice recognition software  Read the chart carefully and recognize, using context, where substitutions have occurred

## 2020-07-13 ENCOUNTER — TELEPHONE (OUTPATIENT)
Dept: NEPHROLOGY | Facility: CLINIC | Age: 85
End: 2020-07-13

## 2020-07-13 ENCOUNTER — OFFICE VISIT (OUTPATIENT)
Dept: NEPHROLOGY | Facility: CLINIC | Age: 85
End: 2020-07-13
Payer: MEDICARE

## 2020-07-13 VITALS — TEMPERATURE: 96.2 F | BODY MASS INDEX: 27.52 KG/M2 | WEIGHT: 165.2 LBS | HEIGHT: 65 IN

## 2020-07-13 DIAGNOSIS — I12.9 HYPERTENSIVE CHRONIC KIDNEY DISEASE WITH STAGE 1 THROUGH STAGE 4 CHRONIC KIDNEY DISEASE, OR UNSPECIFIED CHRONIC KIDNEY DISEASE: Primary | ICD-10-CM

## 2020-07-13 DIAGNOSIS — D63.1 ANEMIA OF CHRONIC RENAL FAILURE, STAGE 4 (SEVERE) (HCC): ICD-10-CM

## 2020-07-13 DIAGNOSIS — E78.5 DYSLIPIDEMIA: ICD-10-CM

## 2020-07-13 DIAGNOSIS — N18.30 BENIGN HYPERTENSION WITH CHRONIC KIDNEY DISEASE, STAGE III (HCC): ICD-10-CM

## 2020-07-13 DIAGNOSIS — E61.1 IRON DEFICIENCY: ICD-10-CM

## 2020-07-13 DIAGNOSIS — N18.30 CHRONIC KIDNEY DISEASE, STAGE III (MODERATE) (HCC): ICD-10-CM

## 2020-07-13 DIAGNOSIS — N18.4 ANEMIA OF CHRONIC RENAL FAILURE, STAGE 4 (SEVERE) (HCC): ICD-10-CM

## 2020-07-13 DIAGNOSIS — I12.9 PARENCHYMAL RENAL HYPERTENSION, STAGE 1 THROUGH STAGE 4 OR UNSPECIFIED CHRONIC KIDNEY DISEASE: ICD-10-CM

## 2020-07-13 DIAGNOSIS — E55.9 VITAMIN D DEFICIENCY: ICD-10-CM

## 2020-07-13 DIAGNOSIS — N18.4 CHRONIC KIDNEY DISEASE, STAGE IV (SEVERE) (HCC): ICD-10-CM

## 2020-07-13 DIAGNOSIS — I12.9 BENIGN HYPERTENSION WITH CHRONIC KIDNEY DISEASE, STAGE III (HCC): ICD-10-CM

## 2020-07-13 DIAGNOSIS — R60.0 LOCALIZED EDEMA: ICD-10-CM

## 2020-07-13 PROCEDURE — 99215 OFFICE O/P EST HI 40 MIN: CPT | Performed by: INTERNAL MEDICINE

## 2020-07-13 RX ORDER — LOSARTAN POTASSIUM 25 MG/1
25 TABLET ORAL
Qty: 90 TABLET | Refills: 3 | Status: SHIPPED | OUTPATIENT
Start: 2020-07-13 | End: 2020-07-15 | Stop reason: SDUPTHER

## 2020-07-13 RX ORDER — HYDRALAZINE HYDROCHLORIDE 25 MG/1
25 TABLET, FILM COATED ORAL 2 TIMES DAILY
Qty: 235 TABLET | Refills: 3 | Status: SHIPPED | OUTPATIENT
Start: 2020-07-13

## 2020-07-13 NOTE — LETTER
July 13, 2020     [address] [place] [address]  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  · Baseline creatinine:  Since September creatinine has been ranging 2 1 to now 2 49  · Current creatinine:  2 49 which may be some element of progression  · Urine protein creatinine ratio:  0 63 g at goal   · Albumin normal at 4 0  Recommendations:  · Treat hypertension-please see below  · Treat dyslipidemia-please see below  · Maintain proteinuria less than 1 g or as low as possible  · Avoid nephrotoxic agents such as NSAIDs, patient counseled as such  · Obtain follow-up basic metabolic profile; consider adjusting losartan  · I will make a nurse educator appointment for modality review and whether not the patient would want to proceed with renal placement therapy in the future  She is an excellent potential candidate as she feels great and looks well  2   Volume:  Chronic edema and part related to amlodipine; amlodipine had been decreased secondary to the edema(negative TSH/venous duplex): Juan Miguel Manifold   · Current status:  Doing extremely well  · Recommendations:  No changes with torsemide  3   Hypertension:  Chronic systolic hypertension with low diastolic readings and mild orthostasis in the past       Current blood pressure averages:   A m :  164/71, standing 147/62  P m :  152/68, standing 133/58  Heart rate:  60-80     · Goal blood pressure:  Less than 130/80 given CAD  Recommendations:  · Avoid salt; weight loss as possible  · Medication changes today:    · Decrease losartan to 25 mg at bedtime to hopefully improve renal function and keep potassium stable and help morning readings  · Increase hydralazine to 75 mg in the evening time again to help with a m  Readings  · Repeat labs in 1-2 weeks nonfasting  · Repeat blood pressures in about 4 weeks  4   Electrolytes:  All acceptable including a potassium 4 9  5   Mineral bone disorder: Of CKD  · Calcium/magnesium/phosphorus:  All acceptable  · PTH intact:  Normal at 40 4  · Vitamin-D:  39:  Acceptable on supplementation from last visit  6   Dyslipidemia:  Typically handled by Cardiology  7   Anemia:    · Current hemoglobin:    11 1 which is normal for the patient  · Iron studies are adequate with a saturation 24 percent/ferritin 245 (from November 2019) recheck next visit  8   Other problems:  · Status post MI with cardiac stents/ Status post aortic valve replacement followed by Cardiology Dr Shital Lyles  · Diabetes mellitus per primary medical physician  · 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        PATIENT INSTRUCTIONS:    Patient Instructions   1  Medication changes today:  · Please decrease losartan to 25 mg at bedtime only  · Please increase hydralazine to 2 tablets or 50 mg in the morning as you are doing, but increase to 3 tablets or 75 mg in the evening towards bedtime    2  Please wait 1 full week after making the above medication changes and go for nonfasting lab work    3   Please wait 4 weeks after making the above medication changes and take 1 week a blood pressure readings morning evening, sitting and standing as follows:  · TAKE THE MORNING READINGS BEFORE ANY MEDICATIONS AND WHEN YOU ARE RELAX FOR SEVERAL MINUTES  · TAKE THE EVENING READINGS BEFORE SUPPER/DINNER AND BEFORE ANY MEDICATIONS AND AGAIN WHEN YOU ARE RELAX FOR SEVERAL MINUTES  · When taking standing readings, keep your arm supported at heart level and not dangling  · Make sure you are sitting with your back supported and feet on the ground and do not cross your legs or feet  · Make sure you have not taken any coffee or caffeine products or exercised or smoke cigarettes at least 30 minutes before taking your blood pressure  Then please mail those into the office    4  Follow-up in 3  months   Please bring in 1 week a blood pressure readings morning evening, sitting and standing is outlined above   PLEASE BRING IN YOUR BLOOD PRESSURE MACHINE FOR CORRELATION WITH THE OFFICE MACHINE AT THE NEXT VISIT   Please go for fasting lab work 1-2 weeks prior to your appointment      5  General instructions:   AVOID SALT BUT NOT ADDING AN READING LABELS TO MAKE SURE THERE IS LOW-SALT IN THE FOOD THAT YOU ARE EATING     Avoid nonsteroidal anti-inflammatory drugs such as Naprosyn, ibuprofen, Aleve, Advil, Celebrex, Meloxicam (Mobic) etc   You can use Tylenol as needed if you do not have any liver condition to be concerned about     Avoid medications such as Sudafed or decongestants and antihistamines that contained the D component which is the decongestant  You can take antihistamines without the decongestant or D component   Try to avoid medications such as pantoprazole or  Protonix/Nexium or Esomeprazole)/Prilosec or omeprazole/Prevacid or lansoprazole/AcipHex or Rabeprazole  If you are able to, use Pepcid as this is safer for your kidneys   Try to exercise at least 30 minutes 3 days a week to begin with with an ultimate goal of 5 days a week for at least 30 minutes     Try to lose 5-10 lb by your next visit     Please do not drink more than 2 glasses of alcohol/wine on a daily basis as this may contribute to your high blood pressure      6  We will make an appointment for you to see our nurse educator in order to teach few about chronic kidney disease as well as the different types of kidney replacement therapy or dialysis  Subjective: There has been no hospitalizations or acute illnesses since last visit  The patient overall is feeling well  No fevers, chills, or cough or colds  Good appetite and good energy  No hematuria, dysuria, voiding symptoms or foamy urine  No gastrointestinal symptoms  No cardiovascular symptoms including swelling of the legs  No headaches, dizziness or lightheadedness  Blood pressure medications:  · Torsemide 20 mg daily plus on Monday Wednesday and Friday at 30 mg  · Toprol  mg twice a day  · Losartan 25 mg twice a day  · Hydralazine 50 mg twice a day  · Amlodipine 5 mg daily in the morning    ROS:  See HPI, otherwise review of systems as completely reviewed with the patient are negative    Past Medical History:   Diagnosis Date    Diabetes mellitus ([facility] [address] )     Hypertension      Past Surgical History:   Procedure Laterality Date    VALVE REPLACEMENT       Family History   Problem Relation Age of Onset    Heart disease Mother     Cancer Father       reports that she has never smoked  She has never used smokeless tobacco  She reports that she does not drink alcohol or use drugs  I COMPLETELY REVIEWED THE PAST MEDICAL HISTORY/PAST SURGICAL HISTORY/SOCIAL HISTORY/FAMILY HISTORY/AND MEDICATIONS  AND UPDATED ALL    Objective:     Vitals:   BP sitting on left:  172/70 with a heart rate of 64 in and slightly irregular  BP standing on left:  172/70 with a heart rate of 72 and slightly irregular    Vitals:    07/13/20 1057   Temp: (!) 96 2 °F (35 7 °C)       Weight (last 2 days)     Date/Time   Weight    07/13/20 1057   74 9 (165 2)            Wt Readings from Last 3 Encounters:   07/13/20 74 9 kg (165 lb 3 2 oz)   02/21/20 87 8 kg (193 lb 9 6 oz)   07/22/19 89 7 kg (197 lb 12 8 oz)       Body mass index is 27 49 kg/m²      Physical Exam: General:  No acute distress  Skin:  No acute rash  Eyes:  No scleral icterus, noninjected, no discharge from eyes  ENT:  Moist mucous membranes  Neck:  Supple, no jugular venous distention, trachea is midline, no lymphadenopathy and no thyromegaly  Back   No CVAT  Chest:  Clear to auscultation and percussion, good respiratory effort  CVS:  Slightly irregular rhythm without a rub, or gallops or murmurs  Abdomen:  Soft and nontender with normal bowel sounds  Extremities:  No cyanosis and only trace lower extremity minimally pitting edema bilaterally with some venous stasis changes, no arthritic changes, normal range of motion  Neuro:  Grossly intact  Psych:  Alert, oriented x3 and appropriate      Medications:    Current Outpatient Medications:     allopurinol (ZYLOPRIM) 100 mg tablet, Take 200 mg by mouth daily  , Disp: , Rfl:     allopurinol (ZYLOPRIM) 300 mg tablet, Take 1 tablet by mouth daily, Disp: , Rfl:     amLODIPine (NORVASC) 5 mg tablet, Take 1 tablet (5 mg total) by mouth daily, Disp: 90 tablet, Rfl: 3    aspirin (ASPIRIN LOW DOSE) 81 MG tablet, Take 1 tablet by mouth daily, Disp: , Rfl:     cholecalciferol (VITAMIN D3) 1,000 units tablet, Take 1,000 Units by mouth daily , Disp: , Rfl:     clopidogrel (PLAVIX) 75 mg tablet, Take 75 mg by mouth daily, Disp: , Rfl:     docusate sodium (COLACE) 100 mg capsule, Take 1 capsule by mouth daily, Disp: , Rfl:     ferrous sulfate 325 (65 FE) MG EC tablet, Take 1 capsule by mouth daily, Disp: , Rfl:     hydrALAZINE (APRESOLINE) 25 mg tablet, Take 1 tablet (25 mg total) by mouth 2 (two) times a day Take 2 tablets in the morning or 50 mg, take 3 tablets or 75 mg in the evening, Disp: 235 tablet, Rfl: 3    losartan (COZAAR) 25 mg tablet, Take 1 tablet (25 mg total) by mouth daily at bedtime, Disp: 90 tablet, Rfl: 3    metoprolol succinate (TOPROL-XL) 100 mg 24 hr tablet, Take 1 tablet by mouth 2 (two) times a day, Disp: , Rfl:     Multiple Vitamins-Minerals (PRESERVISION AREDS PO), Take 1 tablet by mouth daily, Disp: , Rfl:    sitaGLIPtin (JANUVIA) 50 mg tablet, Take 1 tablet by mouth daily, Disp: , Rfl:     torsemide (DEMADEX) 20 mg tablet, Take 1 tablet (20 mg total) by mouth daily Take 20 mg daily except for Monday Wednesday and Friday take 1-1/2 tablets equivalent to 30 mg, Disp: 235 tablet, Rfl: 3    Lab, Imaging and other studies: I have personally reviewed pertinent labs  Laboratory Results:  Results for orders placed or performed in visit on 07/08/20   Protein / creatinine ratio, urine   Result Value Ref Range    PROTEIN UA 51 8     EXT Creatinine Urine 82 1     EXTERNAL Ur Prot/Creat Ratio 0 63    Creatinine Kinase (CK), MB and Total   Result Value Ref Range    Total CK 29 26 - 192 U/L   Comprehensive metabolic panel   Result Value Ref Range    SODIUM 140     POTASSIUM 4 9     CHLORIDE 106     CO2 25     BUN 57     CREATININE 2 49     Glucose 141     EXTERNAL CALCIUM 9 6     TOTAL PROTEIN 7 5     ALBUMIN 4 0     AST 15     ALT 27     ALK PHOS 73     EXTERNAL TOT  BILIRUBIN 0 3     EXTERNAL EGFR 17    Magnesium   Result Value Ref Range    EXTERNAL MAGNESIUM 2 1    Phosphorus   Result Value Ref Range    EXTERNAL PHOSPHORUS 4 3    CBC and differential   Result Value Ref Range    WBC 7 6     External Hemoglobin 11 1 g/dL    Hematocrit 33 %    External Platelets 084 K/µL   PTH, intact   Result Value Ref Range    PTH 40 4              Invalid input(s): ALBUMIN      Radiology review:   chest X-ray    Ultrasound      Portions of the record may have been created with voice recognition software  Occasional wrong word or "sound a like" substitutions may have occurred due to the inherent limitations of voice recognition software  Read the chart carefully and recognize, using context, where substitutions have occurred

## 2020-07-13 NOTE — TELEPHONE ENCOUNTER
Brea Daughter Temp 98 7  COVID Pre-Visit Screening     1  Is this a family member screening? Yes  2  Have you traveled outside of your state in the past 2 weeks? No  3  Do you presently have a fever or flu-like symptoms? No  4  Do you have symptoms of an upper respiratory infection like runny nose, sore throat, or cough? No  5  Are you suffering from new headache that you have not had in the past?  No  6  Do you have/have you experienced any new shortness of breath recently? No  7  Do you have any new diarrhea, nausea or vomiting? No  8  Have you been in contact with anyone who has been sick or diagnosed with COVID-19? No  9  Do you have any new loss of taste or smell? No  10  Are you able to wear a mask without a valve for the entire visit?  Yes

## 2020-07-13 NOTE — PATIENT INSTRUCTIONS
1  Medication changes today:  · Please decrease losartan to 25 mg at bedtime only  · Please increase hydralazine to 2 tablets or 50 mg in the morning as you are doing, but increase to 3 tablets or 75 mg in the evening towards bedtime    2  Please wait 1 full week after making the above medication changes and go for nonfasting lab work    3  Please wait 4 weeks after making the above medication changes and take 1 week a blood pressure readings morning evening, sitting and standing as follows:  · TAKE THE MORNING READINGS BEFORE ANY MEDICATIONS AND WHEN YOU ARE RELAX FOR SEVERAL MINUTES  · TAKE THE EVENING READINGS BEFORE SUPPER/DINNER AND BEFORE ANY MEDICATIONS AND AGAIN WHEN YOU ARE RELAX FOR SEVERAL MINUTES  · When taking standing readings, keep your arm supported at heart level and not dangling  · Make sure you are sitting with your back supported and feet on the ground and do not cross your legs or feet  · Make sure you have not taken any coffee or caffeine products or exercised or smoke cigarettes at least 30 minutes before taking your blood pressure  Then please mail those into the office    4  Follow-up in 3  months   Please bring in 1 week a blood pressure readings morning evening, sitting and standing is outlined above   PLEASE BRING IN YOUR BLOOD PRESSURE MACHINE FOR CORRELATION WITH THE OFFICE MACHINE AT THE NEXT VISIT   Please go for fasting lab work 1-2 weeks prior to your appointment      5  General instructions:   AVOID SALT BUT NOT ADDING AN READING LABELS TO MAKE SURE THERE IS LOW-SALT IN THE FOOD THAT YOU ARE EATING     Avoid nonsteroidal anti-inflammatory drugs such as Naprosyn, ibuprofen, Aleve, Advil, Celebrex, Meloxicam (Mobic) etc   You can use Tylenol as needed if you do not have any liver condition to be concerned about     Avoid medications such as Sudafed or decongestants and antihistamines that contained the D component which is the decongestant    You can take antihistamines without the decongestant or D component   Try to avoid medications such as pantoprazole or  Protonix/Nexium or Esomeprazole)/Prilosec or omeprazole/Prevacid or lansoprazole/AcipHex or Rabeprazole  If you are able to, use Pepcid as this is safer for your kidneys   Try to exercise at least 30 minutes 3 days a week to begin with with an ultimate goal of 5 days a week for at least 30 minutes     Try to lose 5-10 lb by your next visit     Please do not drink more than 2 glasses of alcohol/wine on a daily basis as this may contribute to your high blood pressure  6  We will make an appointment for you to see our nurse educator in order to teach few about chronic kidney disease as well as the different types of kidney replacement therapy or dialysis

## 2020-07-15 DIAGNOSIS — I12.9 PARENCHYMAL RENAL HYPERTENSION, STAGE 1 THROUGH STAGE 4 OR UNSPECIFIED CHRONIC KIDNEY DISEASE: ICD-10-CM

## 2020-07-15 RX ORDER — LOSARTAN POTASSIUM 25 MG/1
25 TABLET ORAL
Qty: 90 TABLET | Refills: 3 | Status: SHIPPED | OUTPATIENT
Start: 2020-07-15 | End: 2020-10-01

## 2020-07-15 RX ORDER — LOSARTAN POTASSIUM 50 MG/1
TABLET ORAL
Qty: 180 TABLET | Refills: 0 | OUTPATIENT
Start: 2020-07-15

## 2020-07-23 ENCOUNTER — DOCUMENTATION (OUTPATIENT)
Dept: NEPHROLOGY | Facility: CLINIC | Age: 85
End: 2020-07-23

## 2020-07-23 ENCOUNTER — TELEPHONE (OUTPATIENT)
Dept: NEPHROLOGY | Facility: CLINIC | Age: 85
End: 2020-07-23

## 2020-07-23 LAB
EXT GLUCOSE BLD: 122
EXTERNAL ALBUMIN: 4
EXTERNAL ALK PHOS: 76
EXTERNAL ALT: 23
EXTERNAL AST: 16
EXTERNAL BUN: 56
EXTERNAL CALCIUM: 9.6
EXTERNAL CHLORIDE: 106
EXTERNAL CO2: 23
EXTERNAL CREATININE: 2.13
EXTERNAL EGFR: 20
EXTERNAL POTASSIUM: 4.2
EXTERNAL SODIUM: 138
EXTERNAL T.BILIRUBIN: 0.3
EXTERNAL TOTAL PROTEIN: 7.5

## 2020-07-23 NOTE — TELEPHONE ENCOUNTER
I spoke to simeon patients daughter, she will make her aware no changes are to be made at this time

## 2020-07-23 NOTE — TELEPHONE ENCOUNTER
----- Message from Segundo Collazo MD sent at 7/23/2020 10:51 AM EDT -----  Creatinine is actually better please let the patient know

## 2020-08-24 ENCOUNTER — DOCUMENTATION (OUTPATIENT)
Dept: NEPHROLOGY | Facility: CLINIC | Age: 85
End: 2020-08-24

## 2020-08-24 NOTE — PROGRESS NOTES
Home blood pressure readings:  -a m :  175/74, standing 153/60  -p m :  162/71, standing 136/60  Heart rate:  60-80 range    The morning readings stool  To be slightly higher than goal although there is some orthostasis  I would recommend adding amlodipine 2 5 milligrams in the evening time and maintain the amlodipine 5 milligrams in the morning    Watch for swelling    Repeat blood pressure readings 4 weeks after making the above medication change, a m   And p m , sitting and standing  · TAKE THE MORNING READINGS BEFORE ANY MEDICATIONS AND WHEN YOU ARE RELAX FOR SEVERAL MINUTES  · TAKE THE EVENING READINGS BEFORE SUPPER/DINNER AND BEFORE ANY MEDICATIONS AND AGAIN WHEN YOU ARE RELAX FOR SEVERAL MINUTES  · PLEASE INCLUDE HEART RATE WITH YOUR BLOOD PRESSURE READINGS  · When taking standing readings, keep your arm supported at heart level and not dangling  · Make sure you are sitting with your back supported and feet on the ground and do not cross your legs or feet  · Make sure you have not taken any coffee or caffeine products or exercised or smoke cigarettes at least 30 minutes before taking your blood pressure    Then mail these into the office

## 2020-08-25 NOTE — PROGRESS NOTES
I spoke to patients daughter, she is aware amlodipine was increased 2 5mg in the evening along with her 5mg in the morning  They will call the office if she will call  If any swelling occurs

## 2020-09-08 ENCOUNTER — TELEPHONE (OUTPATIENT)
Dept: NEPHROLOGY | Facility: CLINIC | Age: 85
End: 2020-09-08

## 2020-09-08 NOTE — TELEPHONE ENCOUNTER
Brayden Dempsey called in stating concerns with swelling since medication changes from Dr Rachel Klien also stated other concerns with another dr's orders and wanted to discuss with office prior to following through

## 2020-09-08 NOTE — TELEPHONE ENCOUNTER
I spoke to the daughter Katharine Casas, patient is having some swelling with increase on amlodipine 2 5 mg in the evening  Daughter advised pt to stop evening dosage at this time and will keep her off until she is seen on October 1,2020 for Her follow up  Patients triglycerides are elevated at 530 per cardiology and he started her on a strict diet and began fish oil daily  daugter would prefer to manage this issue first prior to increasing any further medications

## 2020-09-09 NOTE — TELEPHONE ENCOUNTER
Both of those thoughts by her daughter are reasonable and we will reassess her blood pressure upon next visit

## 2020-09-24 LAB
CREAT ?TM UR-SCNC: 49.4 UMOL/L
EXT PROTEIN URINE: 21.5
PROT/CREAT UR: 0.44 MG/G{CREAT}

## 2020-09-29 LAB
CK SERPL-CCNC: 30 U/L (ref 26–192)
CREAT ?TM UR-SCNC: 49.4 UMOL/L
EXT GLUCOSE BLD: 133
EXT PROTEIN URINE: 21.5
EXTERNAL ALBUMIN: 3.8
EXTERNAL ALK PHOS: 80
EXTERNAL ALT: 28
EXTERNAL AST: 13
EXTERNAL BUN: 51
EXTERNAL CALCIUM: 9.6
EXTERNAL CHLORIDE: 104
EXTERNAL CO2: 26
EXTERNAL CREATININE: 2.06
EXTERNAL EGFR: 21
EXTERNAL MAGNESIUM: 2.3
EXTERNAL PHOSPHORUS: 3.4
EXTERNAL PLATELET COUNT: 177 K/ΜL
EXTERNAL POTASSIUM: 4.3
EXTERNAL PTH: 41.4
EXTERNAL SODIUM: 140
EXTERNAL T.BILIRUBIN: 0.3
EXTERNAL TIBC: 231
EXTERNAL TOTAL PROTEIN: 7
FERRITIN SERPL-MCNC: 213 NG/ML (ref 8–388)
HCT VFR BLD AUTO: 33.7 % (ref 34.8–46.1)
HGB BLD-MCNC: 11.3 G/DL (ref 11.5–15.4)
IRON SATN MFR SERPL: 26 %
IRON SERPL-MCNC: 60 UG/DL (ref 50–170)
PROT/CREAT UR: 0.44 MG/G{CREAT}
TRANSFERRIN SERPL-MCNC: 182 MG/DL (ref 200–400)
WBC # BLD AUTO: 7.5 THOUSAND/UL

## 2020-10-01 ENCOUNTER — TELEPHONE (OUTPATIENT)
Dept: NEPHROLOGY | Facility: CLINIC | Age: 85
End: 2020-10-01

## 2020-10-01 ENCOUNTER — OFFICE VISIT (OUTPATIENT)
Dept: NEPHROLOGY | Facility: CLINIC | Age: 85
End: 2020-10-01
Payer: MEDICARE

## 2020-10-01 VITALS — HEART RATE: 98 BPM | WEIGHT: 187.8 LBS | HEIGHT: 65 IN | BODY MASS INDEX: 31.29 KG/M2

## 2020-10-01 DIAGNOSIS — N18.4 ANEMIA OF CHRONIC RENAL FAILURE, STAGE 4 (SEVERE) (HCC): ICD-10-CM

## 2020-10-01 DIAGNOSIS — I12.9 HYPERTENSIVE CHRONIC KIDNEY DISEASE WITH STAGE 1 THROUGH STAGE 4 CHRONIC KIDNEY DISEASE, OR UNSPECIFIED CHRONIC KIDNEY DISEASE: Primary | ICD-10-CM

## 2020-10-01 DIAGNOSIS — N18.4 CHRONIC KIDNEY DISEASE, STAGE IV (SEVERE) (HCC): ICD-10-CM

## 2020-10-01 DIAGNOSIS — I12.9 PARENCHYMAL RENAL HYPERTENSION, STAGE 1 THROUGH STAGE 4 OR UNSPECIFIED CHRONIC KIDNEY DISEASE: ICD-10-CM

## 2020-10-01 DIAGNOSIS — E55.9 VITAMIN D DEFICIENCY: ICD-10-CM

## 2020-10-01 DIAGNOSIS — E61.1 IRON DEFICIENCY: ICD-10-CM

## 2020-10-01 DIAGNOSIS — E78.5 DYSLIPIDEMIA: ICD-10-CM

## 2020-10-01 DIAGNOSIS — D63.1 ANEMIA OF CHRONIC RENAL FAILURE, STAGE 4 (SEVERE) (HCC): ICD-10-CM

## 2020-10-01 DIAGNOSIS — R60.0 LOCALIZED EDEMA: ICD-10-CM

## 2020-10-01 PROCEDURE — 99214 OFFICE O/P EST MOD 30 MIN: CPT | Performed by: INTERNAL MEDICINE

## 2020-10-01 RX ORDER — LOSARTAN POTASSIUM 25 MG/1
50 TABLET ORAL
Qty: 180 TABLET | Refills: 3 | Status: SHIPPED | OUTPATIENT
Start: 2020-10-01

## 2020-10-01 RX ORDER — OMEGA-3-ACID ETHYL ESTERS 1 G/1
2 CAPSULE, LIQUID FILLED ORAL 2 TIMES DAILY
COMMUNITY

## 2020-10-08 ENCOUNTER — CLINICAL SUPPORT (OUTPATIENT)
Dept: NEPHROLOGY | Facility: CLINIC | Age: 85
End: 2020-10-08
Payer: MEDICARE

## 2020-10-08 DIAGNOSIS — D63.1 ANEMIA OF CHRONIC RENAL FAILURE, STAGE 4 (SEVERE) (HCC): ICD-10-CM

## 2020-10-08 DIAGNOSIS — E55.9 VITAMIN D DEFICIENCY: ICD-10-CM

## 2020-10-08 DIAGNOSIS — N18.4 CHRONIC KIDNEY DISEASE, STAGE IV (SEVERE) (HCC): ICD-10-CM

## 2020-10-08 DIAGNOSIS — I12.9 PARENCHYMAL RENAL HYPERTENSION, STAGE 1 THROUGH STAGE 4 OR UNSPECIFIED CHRONIC KIDNEY DISEASE: ICD-10-CM

## 2020-10-08 DIAGNOSIS — I12.9 HYPERTENSIVE CHRONIC KIDNEY DISEASE WITH STAGE 1 THROUGH STAGE 4 CHRONIC KIDNEY DISEASE, OR UNSPECIFIED CHRONIC KIDNEY DISEASE: ICD-10-CM

## 2020-10-08 DIAGNOSIS — N18.4 ANEMIA OF CHRONIC RENAL FAILURE, STAGE 4 (SEVERE) (HCC): ICD-10-CM

## 2020-10-08 DIAGNOSIS — R60.0 LOCALIZED EDEMA: ICD-10-CM

## 2020-10-08 DIAGNOSIS — E78.5 DYSLIPIDEMIA: ICD-10-CM

## 2020-10-08 DIAGNOSIS — E61.1 IRON DEFICIENCY: ICD-10-CM

## 2020-10-08 PROCEDURE — 97804 MEDICAL NUTRITION GROUP: CPT | Performed by: DIETITIAN, REGISTERED

## 2020-10-09 ENCOUNTER — TELEPHONE (OUTPATIENT)
Dept: NEPHROLOGY | Facility: CLINIC | Age: 85
End: 2020-10-09

## 2021-01-27 LAB
CREAT ?TM UR-SCNC: 89 UMOL/L
EXT PROTEIN URINE: 55.8
PROT/CREAT UR: 0.63 MG/G{CREAT}

## 2021-02-08 ENCOUNTER — DOCUMENTATION (OUTPATIENT)
Dept: NEPHROLOGY | Facility: CLINIC | Age: 86
End: 2021-02-08

## 2021-02-08 DIAGNOSIS — D63.1 ANEMIA OF CHRONIC RENAL FAILURE, STAGE 4 (SEVERE) (HCC): Primary | ICD-10-CM

## 2021-02-08 DIAGNOSIS — N18.4 CHRONIC KIDNEY DISEASE, STAGE IV (SEVERE) (HCC): ICD-10-CM

## 2021-02-08 DIAGNOSIS — N18.4 ANEMIA OF CHRONIC RENAL FAILURE, STAGE 4 (SEVERE) (HCC): Primary | ICD-10-CM

## 2021-02-08 DIAGNOSIS — I12.9 HYPERTENSIVE CHRONIC KIDNEY DISEASE WITH STAGE 1 THROUGH STAGE 4 CHRONIC KIDNEY DISEASE, OR UNSPECIFIED CHRONIC KIDNEY DISEASE: ICD-10-CM

## 2021-02-08 RX ORDER — SPIRONOLACTONE 25 MG/1
12.5 TABLET ORAL DAILY
Qty: 15 TABLET | Refills: 5 | Status: SHIPPED | OUTPATIENT
Start: 2021-02-08 | End: 2021-06-18

## 2021-02-08 NOTE — PROGRESS NOTES
Blood pressure readings:  -a m :  169/71, standing 157/ 62  -p m  :  178/ 73, standing 154/ 64    heartrate:  60-80 range     I would give her a very small dose of spironolactone 12 5 mg daily to help blood pressure  1  Spironolactone 12 5 mg daily in the morning   2  Basic metabolic profile 1-2 weeks nonfasting after new medication has started     3  Wait 4 weeks after the medication has begun and then take an additional 1 week a readings morning evening, sitting and standing with the heart rate  HAVE THE PATIENT BRING IN THE BLOOD PRESSURE MONITOR ALONG WITH THE READINGS TO SEE 1 OF THE ADVANCED PRACTITIONER'S IN ABOUT 4-5 WEEKS        THANKS AND I WILL PLACE THE ORDER FOR SPIRONOLACTONE

## 2021-02-09 NOTE — PROGRESS NOTES
I spoke to the patients daughter Dorothy Perez , she is aware of new medication in addition to what she is already taking  Bmp had been mailed out and will call if she develops any symptoms

## 2021-02-14 ENCOUNTER — IMMUNIZATIONS (OUTPATIENT)
Dept: FAMILY MEDICINE CLINIC | Facility: HOSPITAL | Age: 86
End: 2021-02-14

## 2021-02-14 DIAGNOSIS — Z23 ENCOUNTER FOR IMMUNIZATION: Primary | ICD-10-CM

## 2021-02-14 PROCEDURE — 91300 SARS-COV-2 / COVID-19 MRNA VACCINE (PFIZER-BIONTECH) 30 MCG: CPT

## 2021-02-14 PROCEDURE — 0001A SARS-COV-2 / COVID-19 MRNA VACCINE (PFIZER-BIONTECH) 30 MCG: CPT

## 2021-03-01 ENCOUNTER — OFFICE VISIT (OUTPATIENT)
Dept: NEPHROLOGY | Facility: CLINIC | Age: 86
End: 2021-03-01
Payer: MEDICARE

## 2021-03-01 ENCOUNTER — TELEPHONE (OUTPATIENT)
Dept: NEPHROLOGY | Facility: CLINIC | Age: 86
End: 2021-03-01

## 2021-03-01 VITALS — HEIGHT: 65 IN | WEIGHT: 188.4 LBS | BODY MASS INDEX: 31.39 KG/M2

## 2021-03-01 DIAGNOSIS — I12.9 HYPERTENSIVE CHRONIC KIDNEY DISEASE WITH STAGE 1 THROUGH STAGE 4 CHRONIC KIDNEY DISEASE, OR UNSPECIFIED CHRONIC KIDNEY DISEASE: Primary | ICD-10-CM

## 2021-03-01 DIAGNOSIS — E55.9 VITAMIN D DEFICIENCY: ICD-10-CM

## 2021-03-01 DIAGNOSIS — D63.1 ANEMIA OF CHRONIC RENAL FAILURE, STAGE 4 (SEVERE) (HCC): ICD-10-CM

## 2021-03-01 DIAGNOSIS — N18.4 ANEMIA OF CHRONIC RENAL FAILURE, STAGE 4 (SEVERE) (HCC): ICD-10-CM

## 2021-03-01 DIAGNOSIS — E61.1 IRON DEFICIENCY: ICD-10-CM

## 2021-03-01 DIAGNOSIS — E83.52 HYPERCALCEMIA: ICD-10-CM

## 2021-03-01 DIAGNOSIS — E78.5 DYSLIPIDEMIA: ICD-10-CM

## 2021-03-01 DIAGNOSIS — N18.4 CHRONIC KIDNEY DISEASE, STAGE IV (SEVERE) (HCC): ICD-10-CM

## 2021-03-01 DIAGNOSIS — R60.0 LOCALIZED EDEMA: ICD-10-CM

## 2021-03-01 PROCEDURE — 99214 OFFICE O/P EST MOD 30 MIN: CPT | Performed by: INTERNAL MEDICINE

## 2021-03-01 NOTE — PATIENT INSTRUCTIONS
1  Medication changes today:   No medication changes today  2  Please go for non fasting  lab work over the next 1 to 2 weeks or so    3  Please go for nonfasting lab work in about 6 weeks     4  Please take 1 week a blood pressure readings  in about 1-2 weeks     AS FOLLOWS  MORNING AND EVENING, SITTING AND STANDING as follows:  · TAKE THE MORNING READINGS BEFORE ANY MEDICATIONS AND WHEN YOU ARE RELAXED FOR SEVERAL MINUTES  · TAKE THE EVENING READINGS:  BETWEEN 7-10 P M ; PRIOR TO ANY MEDICATIONS; AT LEAST IN OUR  FROM DINNER; AND CERTAINLY AFTER RELAXING FOR A FEW MINUTES  · PLEASE INCLUDE HEART RATE WITH YOUR BLOOD PRESSURE READINGS  · When taking standing readings, keep your arm supported at heart level and not dangling  · Make sure you are sitting with your back supported and feet on the ground and do not cross your legs or feet  · Make sure you have not taken any coffee or caffeine products or exercised or smoke cigarettes at least 30 minutes before taking your blood pressure  Then please mail these readings into the office    5  Follow-up in 3 months   Please bring in 1 week a blood pressure readings morning evening, sitting and standing is outlined above   PLEASE BRING AN YOUR BLOOD PRESSURE MACHINE TO CORRELATE WITH THE OFFICE MACHINE AT THIS NEXT SCHEDULED VISIT   Please go for fasting lab work 1-2 weeks prior to your appointment      6   General instructions:   AVOID SALT BUT NOT ADDING AN READING LABELS TO MAKE SURE THERE IS LOW-SALT IN THE FOOD THAT YOU ARE EATING  o Goal is less than 2 g of sodium intake or less than 5 g of sodium chloride intake per day     Avoid nonsteroidal anti-inflammatory drugs such as Naprosyn, ibuprofen, Aleve, Advil, Celebrex, Meloxicam (Mobic) etc   You can use Tylenol as needed if you do not have any liver condition to be concerned about     Avoid medications such as Sudafed or decongestants and antihistamines that contained the D component which is the decongestant  You can take antihistamines without the decongestant or D component   Try to avoid medications such as pantoprazole or  Protonix/Nexium or Esomeprazole)/Prilosec or omeprazole/Prevacid or lansoprazole/AcipHex or Rabeprazole  If you are able to, use Pepcid as this is safer for your kidneys   Try to exercise at least 30 minutes 3 days a week to begin with with an ultimate goal of 5 days a week for at least 30 minutes     Try to lose 5-10 lb by your next visit     Please do not drink more than 2 glasses of alcohol/wine on a daily basis as this may contribute to your high blood pressure

## 2021-03-01 NOTE — PROGRESS NOTES
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  · Baseline creatinine:  Since September creatinine has been ranging 2 1 to now 2 49  · Current creatinine:  2 90 which is higher than usual, BUN 76  Prior to this was 2 44 from 01/27/2021  Part of this is probably from the spironolactone to help with blood pressure control  · Urine protein creatinine ratio:  0 63 g at goal   · Albumin normal at 3 8  Recommendations:  · Treat hypertension-please see below  · Treat dyslipidemia-please see below  · Maintain proteinuria less than 1 g or as low as possible  · Avoid nephrotoxic agents such as NSAIDs, patient counseled as such  · The patient did speak with the nurse educator and if renal placement therapy/dialysis is required, she would prefer home dialysis PD  Monitor for now  · Repeat a basic metabolic profile in the next week or so to demonstrate stability      2   Volume:  Chronic edema and part related to amlodipine; amlodipine had been decreased secondary to the edema(negative TSH/venous duplex):     · Current status:  euvolemic  · Recommendations:  no changes this time on torsemide; continue to avoid salt     3   Hypertension:  Chronic systolic hypertension with low diastolic readings and mild orthostasis in the past       Current blood pressure averages:   A m :  190/62 no orthostatic changes  P m  :    200/64 standing 175/55  Heart rate:  60-80 range     · Goal blood pressure:  Less than 130/80 given CAD  Recommendations:  · Avoid salt;and  weight loss as possible  · Medication changes today:    · Spironolactone 12 5 mg daily started  · Recheck rather week a readings at this time    I will be willing to accept a modestly high systolic reading for example 170 as long as her diastolics are low to avoid hypoperfusion      4   Electrolytes:  All acceptable including a potassium 4 9     5   Mineral bone disorder:  Of CKD  · Calcium/magnesium/phosphorus: all acceptable including a calcium 10 1/magnesium 2 4 and a phosphorus 3 5  Avoid magnesium products  · PTH intact:  Normal at 41 4  · Vitamin-D:  39:  Acceptable on supplementation from last visit     6   Dyslipidemia:  Typically handled by Cardiology ( / HDL 34/triglycerides 446)     7   Anemia:    · Current hemoglobin:   11 5 which is normal for the patient  · Iron studies:  Saturation 26%/ferritin   264 acceptable     8   Other problems:  · Status post MI with cardiac stents/ Status post aortic valve replacement followed by Cardiology Dr Rhiannon Schaefer  · Diabetes mellitus per primary medical physician  · 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        PATIENT INSTRUCTIONS:    Patient Instructions   1  Medication changes today:   No medication changes today  2  Please go for non fasting  lab work over the next 1 to 2 weeks or so    3  Please go for nonfasting lab work in about 6 weeks     4  Please take 1 week a blood pressure readings  in about 1-2 weeks     AS FOLLOWS  MORNING AND EVENING, SITTING AND STANDING as follows:  · TAKE THE MORNING READINGS BEFORE ANY MEDICATIONS AND WHEN YOU ARE RELAXED FOR SEVERAL MINUTES  · TAKE THE EVENING READINGS:  BETWEEN 7-10 P M ; PRIOR TO ANY MEDICATIONS; AT LEAST IN OUR  FROM DINNER; AND CERTAINLY AFTER RELAXING FOR A FEW MINUTES  · PLEASE INCLUDE HEART RATE WITH YOUR BLOOD PRESSURE READINGS  · When taking standing readings, keep your arm supported at heart level and not dangling  · Make sure you are sitting with your back supported and feet on the ground and do not cross your legs or feet  · Make sure you have not taken any coffee or caffeine products or exercised or smoke cigarettes at least 30 minutes before taking your blood pressure  Then please mail these readings into the office    5   Follow-up in 3 months   Please bring in 1 week a blood pressure readings morning evening, sitting and standing is outlined above   PLEASE BRING AN YOUR BLOOD PRESSURE MACHINE TO CORRELATE WITH THE OFFICE MACHINE AT THIS NEXT SCHEDULED VISIT   Please go for fasting lab work 1-2 weeks prior to your appointment      6  General instructions:   AVOID SALT BUT NOT ADDING AN READING LABELS TO MAKE SURE THERE IS LOW-SALT IN THE FOOD THAT YOU ARE EATING  o Goal is less than 2 g of sodium intake or less than 5 g of sodium chloride intake per day     Avoid nonsteroidal anti-inflammatory drugs such as Naprosyn, ibuprofen, Aleve, Advil, Celebrex, Meloxicam (Mobic) etc   You can use Tylenol as needed if you do not have any liver condition to be concerned about     Avoid medications such as Sudafed or decongestants and antihistamines that contained the D component which is the decongestant  You can take antihistamines without the decongestant or D component   Try to avoid medications such as pantoprazole or  Protonix/Nexium or Esomeprazole)/Prilosec or omeprazole/Prevacid or lansoprazole/AcipHex or Rabeprazole  If you are able to, use Pepcid as this is safer for your kidneys   Try to exercise at least 30 minutes 3 days a week to begin with with an ultimate goal of 5 days a week for at least 30 minutes     Try to lose 5-10 lb by your next visit     Please do not drink more than 2 glasses of alcohol/wine on a daily basis as this may contribute to your high blood pressure  Subjective: There has been no hospitalizations or acute illnesses since last visit  The patient overall is feeling well  No fevers, chills, or cough or colds    Good appetite and good energy  No hematuria, dysuria, voiding symptoms or foamy urine  No gastrointestinal symptoms  No cardiovascular symptoms including swelling of the legs  No headaches, dizziness or lightheadedness  Chronic neuropathy of her feet  Blood pressure medications:  · Torsemide 20 mg daily except Monday Wednesday and Friday she takes 30 mg  · Spironolactone 12 5 mg daily  · Toprol  mg twice a day  · Losartan 50 mg daily bedtime  · Hydralazine 50 mg in the morning and 75 mg in the evening  · Amlodipine 5 mg daily in the am    ROS:  See HPI, otherwise review of systems as completely reviewed with the patient are negative    Past Medical History:   Diagnosis Date    Diabetes mellitus ([address] )     Hypertension      Past Surgical History:   Procedure Laterality Date    VALVE REPLACEMENT       Family History   Problem Relation Age of Onset    Heart disease Mother     Cancer Father       reports that she has never smoked  She has never used smokeless tobacco  She reports that she does not drink alcohol or use drugs  I COMPLETELY REVIEWED THE PAST MEDICAL HISTORY/PAST SURGICAL HISTORY/SOCIAL HISTORY/FAMILY HISTORY/AND MEDICATIONS  AND UPDATED ALL    Objective:     Vitals:   BP standing on left:  172/70 with a heart rate of 72 and regular  BP sitting on left:  168/70 with a heart rate of 72 and regular, same on right    Weight (last 2 days)     Date/Time   Weight    03/01/21 1411   85 5 (188 4)            Wt Readings from Last 3 Encounters:   03/01/21 85 5 kg (188 lb 6 4 oz)   10/01/20 85 2 kg (187 lb 12 8 oz)   07/13/20 74 9 kg (165 lb 3 2 oz)       Body mass index is 31 35 kg/m²      Physical Exam: General:  No acute distress  Skin:  No acute rash  Eyes:  No scleral icterus, noninjected, no discharge from eyes  ENT:  Moist mucous membranes  Neck:  Supple, no jugular venous distention, trachea is midline, no lymphadenopathy and no thyromegaly  Back   No CVAT  Chest:  Clear to auscultation and percussion, good respiratory effort  CVS:  Regular rate and rhythm without a rub, or gallops or murmurs  Abdomen:  Soft and nontender with normal bowel sounds  Extremities:  No cyanosis and no edema, no arthritic changes, normal range of motion  Neuro:  Grossly intact  Psych:  Alert, oriented x3 and appropriate      Medications:    Current Outpatient Medications:     allopurinol (ZYLOPRIM) 100 mg tablet, Take 200 mg by mouth daily  , Disp: , Rfl:     allopurinol (ZYLOPRIM) 300 mg tablet, Take 1 tablet by mouth daily, Disp: , Rfl:     amLODIPine (NORVASC) 5 mg tablet, Take 1 tablet (5 mg total) by mouth daily (Patient taking differently: Take 5 mg by mouth daily  ), Disp: 90 tablet, Rfl: 3    aspirin (ASPIRIN LOW DOSE) 81 MG tablet, Take 1 tablet by mouth daily, Disp: , Rfl:     cholecalciferol (VITAMIN D3) 1,000 units tablet, Take 1,000 Units by mouth daily , Disp: , Rfl:     clopidogrel (PLAVIX) 75 mg tablet, Take 75 mg by mouth daily, Disp: , Rfl:     docusate sodium (COLACE) 100 mg capsule, Take 1 capsule by mouth daily, Disp: , Rfl:     ferrous sulfate 325 (65 FE) MG EC tablet, Take 1 capsule by mouth daily, Disp: , Rfl:     hydrALAZINE (APRESOLINE) 25 mg tablet, Take 1 tablet (25 mg total) by mouth 2 (two) times a day Take 2 tablets in the morning or 50 mg, take 3 tablets or 75 mg in the evening, Disp: 235 tablet, Rfl: 3    losartan (COZAAR) 25 mg tablet, Take 2 tablets (50 mg total) by mouth daily at bedtime, Disp: 180 tablet, Rfl: 3    metoprolol succinate (TOPROL-XL) 100 mg 24 hr tablet, Take 1 tablet by mouth 2 (two) times a day, Disp: , Rfl:     Multiple Vitamins-Minerals (PRESERVISION AREDS PO), Take 1 tablet by mouth daily, Disp: , Rfl:     omega-3-acid ethyl esters (LOVAZA) 1 g capsule, Take 2 g by mouth 2 (two) times a day, Disp: , Rfl:     sitaGLIPtin (JANUVIA) 50 mg tablet, Take 1 tablet by mouth daily, Disp: , Rfl:     spironolactone (ALDACTONE) 25 mg tablet, Take 0 5 tablets (12 5 mg total) by mouth daily, Disp: 15 tablet, Rfl: 5    torsemide (DEMADEX) 20 mg tablet, Take 1 tablet (20 mg total) by mouth daily Take 20 mg daily except for Monday Wednesday and Friday take 1-1/2 tablets equivalent to 30 mg, Disp: 235 tablet, Rfl: 3    Lab, Imaging and other studies: I have personally reviewed pertinent labs    Laboratory Results:  Results for orders placed or performed in visit on 01/27/21   Protein / creatinine ratio, urine   Result Value Ref Range    PROTEIN UA 55 8     EXT Creatinine Urine 89 0     EXTERNAL Ur Prot/Creat Ratio 0 63              Invalid input(s): ALBUMIN      Radiology review:   chest X-ray    Ultrasound      Portions of the record may have been created with voice recognition software  Occasional wrong word or "sound a like" substitutions may have occurred due to the inherent limitations of voice recognition software  Read the chart carefully and recognize, using context, where substitutions have occurred

## 2021-03-01 NOTE — LETTER
March 1, 2021     [address] [address]    Patient: Haley Flanagan   YOB: 1931   Date of Visit: 3/1/2021       Dear Dr Juan Glover:    Thank you for referring Haley Flanagan to me for evaluation  Below are my notes for this consultation  If you have questions, please do not hesitate to call me  I look forward to following your patient along with you  Sincerely,        Mary Armstrong MD        CC: Lenore Peabody, MD Henrine Schneider, MD Levie Prairie, MD  3/1/2021  2:47 PM  Sign when Signing Visit  RENAL FOLLOW UP NOTE: td    ASSESSMENT AND PLAN:  1  [place]AC[place] [address] :  · Etiology:  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  · Baseline creatinine:  Since September creatinine has been ranging 2 1 to now 2 49  · Current creatinine:  2 90 which is higher than usual, BUN 76  Prior to this was 2 44 from 01/27/2021  Part of this is probably from the spironolactone to help with blood pressure control  · Urine protein creatinine ratio:  0 63 g at goal   · Albumin normal at 3 8  Recommendations:  · Treat hypertension-please see below  · Treat dyslipidemia-please see below  · Maintain proteinuria less than 1 g or as low as possible  · Avoid nephrotoxic agents such as NSAIDs, patient counseled as such  · The patient did speak with the nurse educator and if renal placement therapy/dialysis is required, she would prefer home dialysis PD  Monitor for now    · Repeat a basic metabolic profile in the next week or so to demonstrate stability      2   Volume:  Chronic edema and part related to amlodipine; amlodipine had been decreased secondary to the edema(negative TSH/venous duplex):     · Current status:  euvolemic  · Recommendations:  no changes this time on torsemide; continue to avoid salt     3   Hypertension:  Chronic systolic hypertension with low diastolic readings and mild orthostasis in the past       Current blood pressure averages:   A m :  190/62 no orthostatic changes  P m  :    200/64 standing 175/55  Heart rate:  60-80 range     · Goal blood pressure:  Less than 130/80 given CAD  Recommendations:  · Avoid salt;and  weight loss as possible  · Medication changes today:    · Spironolactone 12 5 mg daily started  · Recheck rather week a readings at this time  I will be willing to accept a modestly high systolic reading for example 170 as long as her diastolics are low to avoid hypoperfusion      4   Electrolytes:  All acceptable including a potassium 4 9     5   Mineral bone disorder:  Of CKD  · Calcium/magnesium/phosphorus: all acceptable including a calcium 10 1/magnesium 2 4 and a phosphorus 3 5  Avoid magnesium products  · PTH intact:  Normal at 41 4  · Vitamin-D:  39:  Acceptable on supplementation from last visit     6   Dyslipidemia:  Typically handled by Cardiology ( / HDL 34/triglycerides 446)     7   Anemia:    · Current hemoglobin:   11 5 which is normal for the patient  · Iron studies:  Saturation 26%/ferritin   264 acceptable     8   Other problems:  · Status post MI with cardiac stents/ Status post aortic valve replacement followed by Cardiology Dr Mike Michelle  · Diabetes mellitus per primary medical physician  · 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        PATIENT INSTRUCTIONS:    Patient Instructions   1  Medication changes today:   No medication changes today  2  Please go for non fasting  lab work over the next 1 to 2 weeks or so    3  Please go for nonfasting lab work in about 6 weeks     4   Please take 1 week a blood pressure readings  in about 1-2 weeks     AS FOLLOWS  MORNING AND EVENING, SITTING AND STANDING as follows:  · TAKE THE MORNING READINGS BEFORE ANY MEDICATIONS AND WHEN YOU ARE RELAXED FOR SEVERAL MINUTES  · TAKE THE EVENING READINGS:  BETWEEN 7-10 P M ; PRIOR TO ANY MEDICATIONS; AT LEAST IN OUR  FROM DINNER; AND CERTAINLY AFTER RELAXING FOR A FEW MINUTES  · PLEASE INCLUDE HEART RATE WITH YOUR BLOOD PRESSURE READINGS  · When taking standing readings, keep your arm supported at heart level and not dangling  · Make sure you are sitting with your back supported and feet on the ground and do not cross your legs or feet  · Make sure you have not taken any coffee or caffeine products or exercised or smoke cigarettes at least 30 minutes before taking your blood pressure  Then please mail these readings into the office    5  Follow-up in 3 months   Please bring in 1 week a blood pressure readings morning evening, sitting and standing is outlined above   PLEASE BRING AN YOUR BLOOD PRESSURE MACHINE TO CORRELATE WITH THE OFFICE MACHINE AT THIS NEXT SCHEDULED VISIT   Please go for fasting lab work 1-2 weeks prior to your appointment      6  General instructions:   AVOID SALT BUT NOT ADDING AN READING LABELS TO MAKE SURE THERE IS LOW-SALT IN THE FOOD THAT YOU ARE EATING  o Goal is less than 2 g of sodium intake or less than 5 g of sodium chloride intake per day     Avoid nonsteroidal anti-inflammatory drugs such as Naprosyn, ibuprofen, Aleve, Advil, Celebrex, Meloxicam (Mobic) etc   You can use Tylenol as needed if you do not have any liver condition to be concerned about     Avoid medications such as Sudafed or decongestants and antihistamines that contained the D component which is the decongestant  You can take antihistamines without the decongestant or D component   Try to avoid medications such as pantoprazole or  Protonix/Nexium or Esomeprazole)/Prilosec or omeprazole/Prevacid or lansoprazole/AcipHex or Rabeprazole  If you are able to, use Pepcid as this is safer for your kidneys       Try to exercise at least 30 minutes 3 days a week to begin with with an ultimate goal of 5 days a week for at least 30 minutes     Try to lose 5-10 lb by your next visit     Please do not drink more than 2 glasses of alcohol/wine on a daily basis as this may contribute to your high blood pressure  Subjective: There has been no hospitalizations or acute illnesses since last visit  The patient overall is feeling well  No fevers, chills, or cough or colds  Good appetite and good energy  No hematuria, dysuria, voiding symptoms or foamy urine  No gastrointestinal symptoms  No cardiovascular symptoms including swelling of the legs  No headaches, dizziness or lightheadedness  Chronic neuropathy of her feet  Blood pressure medications:  · Torsemide 20 mg daily except Monday Wednesday and Friday she takes 30 mg  · Spironolactone 12 5 mg daily  · Toprol  mg twice a day  · Losartan 50 mg daily bedtime  · Hydralazine 50 mg in the morning and 75 mg in the evening  · Amlodipine 5 mg daily in the am    ROS:  See HPI, otherwise review of systems as completely reviewed with the patient are negative    Past Medical History:   Diagnosis Date    Diabetes mellitus ([facility] [address] )     Hypertension      Past Surgical History:   Procedure Laterality Date    VALVE REPLACEMENT       Family History   Problem Relation Age of Onset    Heart disease Mother     Cancer Father       reports that she has never smoked  She has never used smokeless tobacco  She reports that she does not drink alcohol or use drugs  I COMPLETELY REVIEWED THE PAST MEDICAL HISTORY/PAST SURGICAL HISTORY/SOCIAL HISTORY/FAMILY HISTORY/AND MEDICATIONS  AND UPDATED ALL    Objective:     Vitals:   BP standing on left:  172/70 with a heart rate of 72 and regular  BP sitting on left:  168/70 with a heart rate of 72 and regular, same on right    Weight (last 2 days)     Date/Time   Weight    03/01/21 1411   85 5 (188 4)            Wt Readings from Last 3 Encounters:   03/01/21 85 5 kg (188 lb 6 4 oz)   10/01/20 85 2 kg (187 lb 12 8 oz)   07/13/20 74 9 kg (165 lb 3 2 oz)       Body mass index is 31 35 kg/m²      Physical Exam: General:  No acute distress  Skin:  No acute rash  Eyes:  No scleral icterus, noninjected, no discharge from eyes  ENT:  Moist mucous membranes  Neck:  Supple, no jugular venous distention, trachea is midline, no lymphadenopathy and no thyromegaly  Back   No CVAT  Chest:  Clear to auscultation and percussion, good respiratory effort  CVS:  Regular rate and rhythm without a rub, or gallops or murmurs  Abdomen:  Soft and nontender with normal bowel sounds  Extremities:  No cyanosis and no edema, no arthritic changes, normal range of motion  Neuro:  Grossly intact  Psych:  Alert, oriented x3 and appropriate      Medications:    Current Outpatient Medications:     allopurinol (ZYLOPRIM) 100 mg tablet, Take 200 mg by mouth daily  , Disp: , Rfl:     allopurinol (ZYLOPRIM) 300 mg tablet, Take 1 tablet by mouth daily, Disp: , Rfl:     amLODIPine (NORVASC) 5 mg tablet, Take 1 tablet (5 mg total) by mouth daily (Patient taking differently: Take 5 mg by mouth daily  ), Disp: 90 tablet, Rfl: 3    aspirin (ASPIRIN LOW DOSE) 81 MG tablet, Take 1 tablet by mouth daily, Disp: , Rfl:     cholecalciferol (VITAMIN D3) 1,000 units tablet, Take 1,000 Units by mouth daily , Disp: , Rfl:     clopidogrel (PLAVIX) 75 mg tablet, Take 75 mg by mouth daily, Disp: , Rfl:     docusate sodium (COLACE) 100 mg capsule, Take 1 capsule by mouth daily, Disp: , Rfl:     ferrous sulfate 325 (65 FE) MG EC tablet, Take 1 capsule by mouth daily, Disp: , Rfl:     hydrALAZINE (APRESOLINE) 25 mg tablet, Take 1 tablet (25 mg total) by mouth 2 (two) times a day Take 2 tablets in the morning or 50 mg, take 3 tablets or 75 mg in the evening, Disp: 235 tablet, Rfl: 3    losartan (COZAAR) 25 mg tablet, Take 2 tablets (50 mg total) by mouth daily at bedtime, Disp: 180 tablet, Rfl: 3    metoprolol succinate (TOPROL-XL) 100 mg 24 hr tablet, Take 1 tablet by mouth 2 (two) times a day, Disp: , Rfl:     Multiple Vitamins-Minerals (PRESERVISION AREDS PO), Take 1 tablet by mouth daily, Disp: , Rfl:     omega-3-acid ethyl esters (LOVAZA) 1 g capsule, Take 2 g by mouth 2 (two) times a day, Disp: , Rfl:     sitaGLIPtin (JANUVIA) 50 mg tablet, Take 1 tablet by mouth daily, Disp: , Rfl:     spironolactone (ALDACTONE) 25 mg tablet, Take 0 5 tablets (12 5 mg total) by mouth daily, Disp: 15 tablet, Rfl: 5    torsemide (DEMADEX) 20 mg tablet, Take 1 tablet (20 mg total) by mouth daily Take 20 mg daily except for Monday Wednesday and Friday take 1-1/2 tablets equivalent to 30 mg, Disp: 235 tablet, Rfl: 3    Lab, Imaging and other studies: I have personally reviewed pertinent labs  Laboratory Results:  Results for orders placed or performed in visit on 01/27/21   Protein / creatinine ratio, urine   Result Value Ref Range    PROTEIN UA 55 8     EXT Creatinine Urine 89 0     EXTERNAL Ur Prot/Creat Ratio 0 63              Invalid input(s): ALBUMIN      Radiology review:   chest X-ray    Ultrasound      Portions of the record may have been created with voice recognition software  Occasional wrong word or "sound a like" substitutions may have occurred due to the inherent limitations of voice recognition software  Read the chart carefully and recognize, using context, where substitutions have occurred

## 2021-03-01 NOTE — TELEPHONE ENCOUNTER

## 2021-03-07 ENCOUNTER — IMMUNIZATIONS (OUTPATIENT)
Dept: FAMILY MEDICINE CLINIC | Facility: HOSPITAL | Age: 86
End: 2021-03-07

## 2021-03-07 DIAGNOSIS — Z23 ENCOUNTER FOR IMMUNIZATION: Primary | ICD-10-CM

## 2021-03-07 PROCEDURE — 0002A SARS-COV-2 / COVID-19 MRNA VACCINE (PFIZER-BIONTECH) 30 MCG: CPT

## 2021-03-07 PROCEDURE — 91300 SARS-COV-2 / COVID-19 MRNA VACCINE (PFIZER-BIONTECH) 30 MCG: CPT

## 2021-03-25 ENCOUNTER — DOCUMENTATION (OUTPATIENT)
Dept: NEPHROLOGY | Facility: CLINIC | Age: 86
End: 2021-03-25

## 2021-03-25 NOTE — PROGRESS NOTES
Home blood pressure readings:  -A  m :  176/ 62, standing 145/ 56   -p m  :  174/59, standing 150/ 56   heart rate:  50-60     recommend:    Her blood pressures are markedly improved on spironolactone 12 5 mg daily  We have to accept a slightly higher systolic reading even in the 170 range which is markedly improved compared to prior; to avoid too low of a already very low diastolic reading to avoid hypoperfusion        Therefore:  No changes maintain current medical regimen

## 2021-03-30 ENCOUNTER — TELEPHONE (OUTPATIENT)
Dept: NEPHROLOGY | Facility: CLINIC | Age: 86
End: 2021-03-30

## 2021-03-30 DIAGNOSIS — N18.4 ANEMIA OF CHRONIC RENAL FAILURE, STAGE 4 (SEVERE) (HCC): ICD-10-CM

## 2021-03-30 DIAGNOSIS — D63.1 ANEMIA OF CHRONIC RENAL FAILURE, STAGE 4 (SEVERE) (HCC): ICD-10-CM

## 2021-03-30 DIAGNOSIS — N18.30 STAGE 3 CHRONIC KIDNEY DISEASE, UNSPECIFIED WHETHER STAGE 3A OR 3B CKD (HCC): ICD-10-CM

## 2021-03-30 DIAGNOSIS — I12.9 BENIGN HYPERTENSION WITH CHRONIC KIDNEY DISEASE, STAGE III (HCC): Primary | ICD-10-CM

## 2021-03-30 DIAGNOSIS — N18.30 BENIGN HYPERTENSION WITH CHRONIC KIDNEY DISEASE, STAGE III (HCC): Primary | ICD-10-CM

## 2021-03-30 NOTE — TELEPHONE ENCOUNTER
----- Message from Kimberly Somers MD sent at 3/30/2021 10:14 AM EDT -----  Potassium is borderline high  Recommend stopping spironolactone  Repeat a basic metabolic profile 1-2 weeks nonfasting

## 2021-03-30 NOTE — TELEPHONE ENCOUNTER
Lm for the patient advising K is elevated, please stop spironolactone  Labs due in 1-2 weeks nonfasting

## 2021-04-09 DIAGNOSIS — I12.9 BENIGN HYPERTENSION WITH CHRONIC KIDNEY DISEASE, STAGE III (HCC): ICD-10-CM

## 2021-04-09 DIAGNOSIS — E87.2 METABOLIC ACIDOSIS: ICD-10-CM

## 2021-04-09 DIAGNOSIS — N18.30 BENIGN HYPERTENSION WITH CHRONIC KIDNEY DISEASE, STAGE III (HCC): ICD-10-CM

## 2021-04-09 DIAGNOSIS — N18.30 STAGE 3 CHRONIC KIDNEY DISEASE, UNSPECIFIED WHETHER STAGE 3A OR 3B CKD (HCC): Primary | ICD-10-CM

## 2021-04-09 RX ORDER — SODIUM BICARBONATE 650 MG/1
650 TABLET ORAL 2 TIMES DAILY
Qty: 60 TABLET | Refills: 5 | Status: SHIPPED | OUTPATIENT
Start: 2021-04-09 | End: 2021-06-18

## 2021-04-09 NOTE — TELEPHONE ENCOUNTER
----- Message from Shad Smith MD sent at 4/9/2021  9:35 AM EDT -----  Potassium slightly better at 4 6  Bicarbonate slightly low 20  BUN and creatinine relatively the same    Recommend  1  Sodium bicarbonate 650 mg twice a day  2   Repeat a basic metabolic profile in 4 weeks

## 2021-04-19 ENCOUNTER — TELEPHONE (OUTPATIENT)
Dept: NEPHROLOGY | Facility: CLINIC | Age: 86
End: 2021-04-19

## 2021-04-19 NOTE — TELEPHONE ENCOUNTER
Patients daughter called the office she wanted to make you aware that she stopped sodium bicarbonate 650 mg twice a day  Per daughter patient got nauseas, and lightheaded after starting medication  She is doing much better now  Pt having labs done tomorrow

## 2021-06-10 LAB
CREAT ?TM UR-SCNC: 46.3 UMOL/L
EXT PROTEIN URINE: 15.9
PROT/CREAT UR: 0.34 MG/G{CREAT}

## 2021-06-12 NOTE — PROGRESS NOTES
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  · Baseline creatinine:  Since September creatinine has been ranging 2 1- 2 49  · Current creatinine:   2 48 at baseline  · Urine protein creatinine ratio:  0 34 g at goal   · Albumin normal at 3 8  Recommendations:  · Treat hypertension-please see below  · Treat dyslipidemia-please see below  · Maintain proteinuria less than 1 g or as low as possible  · Avoid nephrotoxic agents such as NSAIDs, patient counseled as such  · The patient did speak with the nurse educator and if renal placement therapy/dialysis is required, she would prefer home dialysis PD   Monitor for now      2   Volume:  Chronic edema and part related to amlodipine; amlodipine had been decreased secondary to the edema(negative TSH/venous duplex):     · Current status:  Stable  · Recommendations:  No changes continue current torsemide     3   Hypertension:  Chronic systolic hypertension with low diastolic readings and mild orthostasis in the past       Current blood pressure averages:   A m :  180 66, standing 163/59  P m  :   267/83, standing 158/59  Heart rate:   60-7     · Goal blood pressure:  Less than 130/80 given CAD  Recommendations:  · Avoid salt;and  weight loss as possible  · Medication changes today:    · Increase hydralazine as 75 b i d   · Next step potentially increase torsemide to 30 mg daily  · After that consideration for increasing hydralazine further     4   Electrolytes:    · Sodium and potassium both normal  · Mild metabolic acidosis at 22: Just monitor for now as it is borderline     5   Mineral bone disorder:  Of CKD  · Calcium/magnesium/phosphorus: all acceptable including a calcium 10 1/magnesium 2 2 and a phosphorus 3 8  Jamie Cortes   · PTH intact:  Normal at 41 4  · Vitamin-D:  39:  Acceptable on supplementation from last visit     6   Dyslipidemia:  Typically handled by Cardiology /primary medical physician ( LDL:  Not calculable given high triglyceride level/HDL 35/triglycerides 524) Abbey Mayo was just increased by her primary physician     7   Anemia:    · Current hemoglobin:   11 4 which is normal for the patient  · Iron studies:  Saturation 26%/ferritin   264 acceptable     8   Other problems:  · Status post MI with cardiac stents/ Status post aortic valve replacement followed by Cardiology Dr Amelia Pablo  · Diabetes mellitus per primary medical physician  · 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        PATIENT INSTRUCTIONS:    Patient Instructions   1  Medication changes today:   Please increase hydralazine to 3 pills twice a day are 75 mg twice a day        2  Please take 1 week a blood pressure readings  4 weeks after making the above medication change     AS FOLLOWS  MORNING AND EVENING, SITTING AND STANDING as follows:  · TAKE THE MORNING READINGS BEFORE ANY MEDICATIONS AND WHEN YOU ARE RELAXED FOR SEVERAL MINUTES  · TAKE THE EVENING READINGS:  BETWEEN 7-10 P M ; PRIOR TO ANY MEDICATIONS; AT LEAST IN OUR  FROM DINNER; AND CERTAINLY AFTER RELAXING FOR A FEW MINUTES  · PLEASE INCLUDE HEART RATE WITH YOUR BLOOD PRESSURE READINGS  · When taking standing readings, keep your arm supported at heart level and not dangling  · Make sure you are sitting with your back supported and feet on the ground and do not cross your legs or feet  · Make sure you have not taken any coffee or caffeine products or exercised or smoke cigarettes at least 30 minutes before taking your blood pressure  Then please mail these readings into the office    3   Follow-up in 3-4 months   Please bring in 1 week a blood pressure readings morning evening, sitting and standing is outlined above   PLEASE BRING AN YOUR BLOOD PRESSURE MACHINE TO CORRELATE WITH THE OFFICE MACHINE AT THIS NEXT SCHEDULED VISIT   Please go for fasting lab work 1-2 weeks prior to your appointment:  Please do the lab work in 3 months      4  General instructions:   AVOID SALT BUT NOT ADDING AN READING LABELS TO MAKE SURE THERE IS LOW-SALT IN THE FOOD THAT YOU ARE EATING  o Goal is less than 2 g of sodium intake or less than 5 g of sodium chloride intake per day     Avoid nonsteroidal anti-inflammatory drugs such as Naprosyn, ibuprofen, Aleve, Advil, Celebrex, Meloxicam (Mobic) etc   You can use Tylenol as needed if you do not have any liver condition to be concerned about     Avoid medications such as Sudafed or decongestants and antihistamines that contained the D component which is the decongestant  You can take antihistamines without the decongestant or D component   Try to avoid medications such as pantoprazole or  Protonix/Nexium or Esomeprazole)/Prilosec or omeprazole/Prevacid or lansoprazole/AcipHex or Rabeprazole  If you are able to, use Pepcid as this is safer for your kidneys   Try to exercise at least 30 minutes 3 days a week to begin with with an ultimate goal of 5 days a week for at least 30 minutes     Try to lose 5-10 lb by your next visit     Please do not drink more than 2 glasses of alcohol/wine on a daily basis as this may contribute to your high blood pressure  Subjective: There has been no hospitalizations or acute illnesses since last visit    The patient overall is feeling well except for neuropathy symptoms of her feet  No fevers, chills,  or colds or cough  Good appetite and good energy  No hematuria, dysuria, voiding symptoms or foamy urine  No gastrointestinal symptoms  No cardiovascular symptoms, some mild edema but stable  No headaches, dizziness or lightheadedness  Blood pressure medications/renal pertinent medications:  · Torsemide 20 mg daily, 3 days a week she takes 30 mg Monday Wednesday and Friday  · Toprol  mg twice a day  · Losartan 50 mg a day  · Hydralazine 50 mg the morning 75 mg the evening  · Amlodipine 5 mg daily in morning  · Iron once a day  · Vitamin-D 1000 daily      ROS:  See HPI, otherwise review of systems as completely reviewed with the patient are negative    Past Medical History:   Diagnosis Date    Diabetes mellitus ([address] )     Hypertension      Past Surgical History:   Procedure Laterality Date    VALVE REPLACEMENT       Family History   Problem Relation Age of Onset    Heart disease Mother     Cancer Father       reports that she has never smoked  She has never used smokeless tobacco  She reports that she does not drink alcohol and does not use drugs  I COMPLETELY REVIEWED THE PAST MEDICAL HISTORY/PAST SURGICAL HISTORY/SOCIAL HISTORY/FAMILY HISTORY/AND MEDICATIONS  AND UPDATED ALL    Objective:     Vitals:   BP sitting on left:  170/70 with a heart rate of 68 and regular  BP standing on left:  170/80 with a heart rate of 68 and regular    Weight (last 2 days)     Date/Time   Weight    06/18/21 1630   87 5 (193)            Wt Readings from Last 3 Encounters:   06/18/21 87 5 kg (193 lb)   03/01/21 85 5 kg (188 lb 6 4 oz)   10/01/20 85 2 kg (187 lb 12 8 oz)       Body mass index is 32 12 kg/m²      Physical Exam: General:  No acute distress/obese  Skin:  No acute rash  Eyes:  No scleral icterus, noninjected, no discharge from eyes  ENT:  Moist mucous membranes  Neck:  Supple, no jugular venous distention, trachea is midline, no lymphadenopathy and no thyromegaly  Back   No CVAT  Chest:  Clear to auscultation and percussion, good respiratory effort  CVS:  Regular rate and rhythm without a rub, or gallops ; mild grade 2/6 systolic ejection type murmur the crisp valve sound  Abdomen:  Obese,Soft and nontender with normal bowel sounds  Extremities:  No cyanosis and 1+ minimally pitting edema of the lower extremities 2/3 the way up the pretibial region no arthritic changes, normal range of motion  Neuro:  Grossly intact  Psych:  Alert, oriented x3 and appropriate      Medications:    Current Outpatient Medications:     allopurinol (ZYLOPRIM) 100 mg tablet, Take 200 mg by mouth daily  , Disp: , Rfl:     allopurinol (ZYLOPRIM) 300 mg tablet, Take 1 tablet by mouth daily, Disp: , Rfl:     amLODIPine (NORVASC) 5 mg tablet, Take 1 tablet (5 mg total) by mouth daily (Patient taking differently: Take 5 mg by mouth daily  ), Disp: 90 tablet, Rfl: 3    aspirin (ASPIRIN LOW DOSE) 81 MG tablet, Take 1 tablet by mouth daily, Disp: , Rfl:     cholecalciferol (VITAMIN D3) 1,000 units tablet, Take 1,000 Units by mouth daily , Disp: , Rfl:     clopidogrel (PLAVIX) 75 mg tablet, Take 75 mg by mouth daily, Disp: , Rfl:     docusate sodium (COLACE) 100 mg capsule, Take 1 capsule by mouth daily, Disp: , Rfl:     ferrous sulfate 325 (65 FE) MG EC tablet, Take 1 capsule by mouth daily, Disp: , Rfl:     hydrALAZINE (APRESOLINE) 25 mg tablet, Take 1 tablet (25 mg total) by mouth 2 (two) times a day Take 2 tablets in the morning or 50 mg, take 3 tablets or 75 mg in the evening, Disp: 235 tablet, Rfl: 3    losartan (COZAAR) 25 mg tablet, Take 2 tablets (50 mg total) by mouth daily at bedtime, Disp: 180 tablet, Rfl: 3    metoprolol succinate (TOPROL-XL) 100 mg 24 hr tablet, Take 1 tablet by mouth 2 (two) times a day, Disp: , Rfl:     Multiple Vitamins-Minerals (PRESERVISION AREDS PO), Take 1 tablet by mouth daily, Disp: , Rfl:     omega-3-acid ethyl esters (LOVAZA) 1 g capsule, Take 2 g by mouth 2 (two) times a day, Disp: , Rfl:     sitaGLIPtin (JANUVIA) 50 mg tablet, Take 1 tablet by mouth daily, Disp: , Rfl:     torsemide (DEMADEX) 20 mg tablet, Take 1 tablet (20 mg total) by mouth daily Take 20 mg daily except for Monday Wednesday and Friday take 1-1/2 tablets equivalent to 30 mg, Disp: 235 tablet, Rfl: 3    Lab, Imaging and other studies: I have personally reviewed pertinent labs    Laboratory Results:  Results for orders placed or performed in visit on 06/10/21   Protein / creatinine ratio, urine   Result Value Ref Range    PROTEIN UA 15 9     EXT Creatinine Urine 46 3     EXTERNAL Ur Prot/Creat Ratio 0 34              Invalid input(s): ALBUMIN      Radiology review:   chest X-ray    Ultrasound      Portions of the record may have been created with voice recognition software  Occasional wrong word or "sound a like" substitutions may have occurred due to the inherent limitations of voice recognition software  Read the chart carefully and recognize, using context, where substitutions have occurred

## 2021-06-18 ENCOUNTER — OFFICE VISIT (OUTPATIENT)
Dept: NEPHROLOGY | Facility: CLINIC | Age: 86
End: 2021-06-18
Payer: MEDICARE

## 2021-06-18 VITALS — WEIGHT: 193 LBS | BODY MASS INDEX: 32.15 KG/M2 | HEIGHT: 65 IN

## 2021-06-18 DIAGNOSIS — E61.1 IRON DEFICIENCY: ICD-10-CM

## 2021-06-18 DIAGNOSIS — D63.1 ANEMIA OF CHRONIC RENAL FAILURE, STAGE 4 (SEVERE) (HCC): ICD-10-CM

## 2021-06-18 DIAGNOSIS — N18.4 ANEMIA OF CHRONIC RENAL FAILURE, STAGE 4 (SEVERE) (HCC): ICD-10-CM

## 2021-06-18 DIAGNOSIS — E87.79 OTHER HYPERVOLEMIA: ICD-10-CM

## 2021-06-18 DIAGNOSIS — E55.9 VITAMIN D DEFICIENCY: ICD-10-CM

## 2021-06-18 DIAGNOSIS — N18.4 CHRONIC KIDNEY DISEASE, STAGE IV (SEVERE) (HCC): ICD-10-CM

## 2021-06-18 DIAGNOSIS — E78.5 DYSLIPIDEMIA: ICD-10-CM

## 2021-06-18 DIAGNOSIS — I12.9 PARENCHYMAL RENAL HYPERTENSION, STAGE 1 THROUGH STAGE 4 OR UNSPECIFIED CHRONIC KIDNEY DISEASE: Primary | ICD-10-CM

## 2021-06-18 DIAGNOSIS — R60.0 LOCALIZED EDEMA: ICD-10-CM

## 2021-06-18 PROCEDURE — 99214 OFFICE O/P EST MOD 30 MIN: CPT | Performed by: INTERNAL MEDICINE

## 2021-06-18 NOTE — LETTER
June 18, 2021     M[place]uel Light, [address]  [facility] [address] [address]    Patient: Juany Valladares   YOB: 1931   Date of Visit: 6/18/2021       Dear Dr Benjamin Walters:    Thank you for referring Juany Valladares to me for evaluation  Below are my notes for this consultation  If you have questions, please do not hesitate to call me  I look forward to following your patient along with you  Sincerely,        Zee Jj MD        CC: MD Zee Covington MD  6/18/2021  4:57 PM  Sign when Signing Visit  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  · Baseline creatinine:  Since September creatinine has been ranging 2 1- 2 49  · Current creatinine:   2 48 at baseline  · Urine protein creatinine ratio:  0 34 g at goal   · Albumin normal at 3 8  Recommendations:  · Treat hypertension-please see below  · Treat dyslipidemia-please see below  · Maintain proteinuria less than 1 g or as low as possible  · Avoid nephrotoxic agents such as NSAIDs, patient counseled as such  · The patient did speak with the nurse educator and if renal placement therapy/dialysis is required, she would prefer home dialysis PD   Monitor for now      2   Volume:  Chronic edema and part related to amlodipine; amlodipine had been decreased secondary to the edema(negative TSH/venous duplex):     · Current status:  Stable  · Recommendations:  No changes continue current torsemide     3   Hypertension:  Chronic systolic hypertension with low diastolic readings and mild orthostasis in the past       Current blood pressure averages:   A m :  180 66, standing 163/59  P m  :   251/64, standing 158/59  Heart rate:   60-7     · Goal blood pressure:  Less than 130/80 given CAD  Recommendations:  · Avoid salt;and  weight loss as possible  · Medication changes today:    · Increase hydralazine as 75 b i d  · Next step potentially increase torsemide to 30 mg daily  · After that consideration for increasing hydralazine further     4   Electrolytes:    · Sodium and potassium both normal  · Mild metabolic acidosis at 22: Just monitor for now as it is borderline     5   Mineral bone disorder:  Of CKD  · Calcium/magnesium/phosphorus: all acceptable including a calcium 10 1/magnesium 2 2 and a phosphorus 3 8  Juan Miguel Manifold · PTH intact:  Normal at 41 4  · Vitamin-D:  39:  Acceptable on supplementation from last visit     6   Dyslipidemia:  Typically handled by Cardiology /primary medical physician ( LDL:  Not calculable given high triglyceride level/HDL 35/triglycerides 524) Angela Talbert was just increased by her primary physician     7   Anemia:    · Current hemoglobin:   11 4 which is normal for the patient  · Iron studies:  Saturation 26%/ferritin   264 acceptable     8   Other problems:  · Status post MI with cardiac stents/ Status post aortic valve replacement followed by Cardiology Dr Phoebe Fritz  · Diabetes mellitus per primary medical physician  · 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        PATIENT INSTRUCTIONS:    Patient Instructions   1  Medication changes today:   Please increase hydralazine to 3 pills twice a day are 75 mg twice a day        2   Please take 1 week a blood pressure readings  4 weeks after making the above medication change     AS FOLLOWS  MORNING AND EVENING, SITTING AND STANDING as follows:  · TAKE THE MORNING READINGS BEFORE ANY MEDICATIONS AND WHEN YOU ARE RELAXED FOR SEVERAL MINUTES  · TAKE THE EVENING READINGS:  BETWEEN 7-10 P M ; PRIOR TO ANY MEDICATIONS; AT LEAST IN OUR  FROM DINNER; AND CERTAINLY AFTER RELAXING FOR A FEW MINUTES  · PLEASE INCLUDE HEART RATE WITH YOUR BLOOD PRESSURE READINGS  · When taking standing readings, keep your arm supported at heart level and not dangling  · Make sure you are sitting with your back supported and feet on the ground and do not cross your legs or feet  · Make sure you have not taken any coffee or caffeine products or exercised or smoke cigarettes at least 30 minutes before taking your blood pressure  Then please mail these readings into the office    3  Follow-up in 3-4 months   Please bring in 1 week a blood pressure readings morning evening, sitting and standing is outlined above   PLEASE BRING AN YOUR BLOOD PRESSURE MACHINE TO CORRELATE WITH THE OFFICE MACHINE AT THIS NEXT SCHEDULED VISIT   Please go for fasting lab work 1-2 weeks prior to your appointment:  Please do the lab work in 3 months      4  General instructions:   AVOID SALT BUT NOT ADDING AN READING LABELS TO MAKE SURE THERE IS LOW-SALT IN THE FOOD THAT YOU ARE EATING  o Goal is less than 2 g of sodium intake or less than 5 g of sodium chloride intake per day     Avoid nonsteroidal anti-inflammatory drugs such as Naprosyn, ibuprofen, Aleve, Advil, Celebrex, Meloxicam (Mobic) etc   You can use Tylenol as needed if you do not have any liver condition to be concerned about     Avoid medications such as Sudafed or decongestants and antihistamines that contained the D component which is the decongestant  You can take antihistamines without the decongestant or D component   Try to avoid medications such as pantoprazole or  Protonix/Nexium or Esomeprazole)/Prilosec or omeprazole/Prevacid or lansoprazole/AcipHex or Rabeprazole  If you are able to, use Pepcid as this is safer for your kidneys   Try to exercise at least 30 minutes 3 days a week to begin with with an ultimate goal of 5 days a week for at least 30 minutes     Try to lose 5-10 lb by your next visit     Please do not drink more than 2 glasses of alcohol/wine on a daily basis as this may contribute to your high blood pressure  Subjective: There has been no hospitalizations or acute illnesses since last visit    The patient overall is feeling well except for neuropathy symptoms of her feet  No fevers, chills,  or colds or cough  Good appetite and good energy  No hematuria, dysuria, voiding symptoms or foamy urine  No gastrointestinal symptoms  No cardiovascular symptoms, some mild edema but stable  No headaches, dizziness or lightheadedness  Blood pressure medications/renal pertinent medications:  · Torsemide 20 mg daily, 3 days a week she takes 30 mg Monday Wednesday and Friday  · Toprol  mg twice a day  · Losartan 50 mg a day  · Hydralazine 50 mg the morning 75 mg the evening  · Amlodipine 5 mg daily in morning  · Iron once a day  · Vitamin-D 1000 daily      ROS:  See HPI, otherwise review of systems as completely reviewed with the patient are negative    Past Medical History:   Diagnosis Date    Diabetes mellitus ([address] )     Hypertension      Past Surgical History:   Procedure Laterality Date    VALVE REPLACEMENT       Family History   Problem Relation Age of Onset    Heart disease Mother     Cancer Father       reports that she has never smoked  She has never used smokeless tobacco  She reports that she does not drink alcohol and does not use drugs  I COMPLETELY REVIEWED THE PAST MEDICAL HISTORY/PAST SURGICAL HISTORY/SOCIAL HISTORY/FAMILY HISTORY/AND MEDICATIONS  AND UPDATED ALL    Objective:     Vitals:   BP sitting on left:  170/70 with a heart rate of 68 and regular  BP standing on left:  170/80 with a heart rate of 68 and regular    Weight (last 2 days)     Date/Time   Weight    06/18/21 1630   87 5 (193)            Wt Readings from Last 3 Encounters:   06/18/21 87 5 kg (193 lb)   03/01/21 85 5 kg (188 lb 6 4 oz)   10/01/20 85 2 kg (187 lb 12 8 oz)       Body mass index is 32 12 kg/m²      Physical Exam: General:  No acute distress/obese  Skin:  No acute rash  Eyes:  No scleral icterus, noninjected, no discharge from eyes  ENT:  Moist mucous membranes  Neck:  Supple, no jugular venous distention, trachea is midline, no lymphadenopathy and no thyromegaly  Back   No CVAT  Chest:  Clear to auscultation and percussion, good respiratory effort  CVS:  Regular rate and rhythm without a rub, or gallops ; mild grade 2/6 systolic ejection type murmur the crisp valve sound  Abdomen:  Obese,Soft and nontender with normal bowel sounds  Extremities:  No cyanosis and 1+ minimally pitting edema of the lower extremities 2/3 the way up the pretibial region no arthritic changes, normal range of motion  Neuro:  Grossly intact  Psych:  Alert, oriented x3 and appropriate      Medications:    Current Outpatient Medications:     allopurinol (ZYLOPRIM) 100 mg tablet, Take 200 mg by mouth daily  , Disp: , Rfl:     allopurinol (ZYLOPRIM) 300 mg tablet, Take 1 tablet by mouth daily, Disp: , Rfl:     amLODIPine (NORVASC) 5 mg tablet, Take 1 tablet (5 mg total) by mouth daily (Patient taking differently: Take 5 mg by mouth daily  ), Disp: 90 tablet, Rfl: 3    aspirin (ASPIRIN LOW DOSE) 81 MG tablet, Take 1 tablet by mouth daily, Disp: , Rfl:     cholecalciferol (VITAMIN D3) 1,000 units tablet, Take 1,000 Units by mouth daily , Disp: , Rfl:     clopidogrel (PLAVIX) 75 mg tablet, Take 75 mg by mouth daily, Disp: , Rfl:     docusate sodium (COLACE) 100 mg capsule, Take 1 capsule by mouth daily, Disp: , Rfl:     ferrous sulfate 325 (65 FE) MG EC tablet, Take 1 capsule by mouth daily, Disp: , Rfl:     hydrALAZINE (APRESOLINE) 25 mg tablet, Take 1 tablet (25 mg total) by mouth 2 (two) times a day Take 2 tablets in the morning or 50 mg, take 3 tablets or 75 mg in the evening, Disp: 235 tablet, Rfl: 3    losartan (COZAAR) 25 mg tablet, Take 2 tablets (50 mg total) by mouth daily at bedtime, Disp: 180 tablet, Rfl: 3    metoprolol succinate (TOPROL-XL) 100 mg 24 hr tablet, Take 1 tablet by mouth 2 (two) times a day, Disp: , Rfl:     Multiple Vitamins-Minerals (PRESERVISION AREDS PO), Take 1 tablet by mouth daily, Disp: , Rfl:     omega-3-acid ethyl esters (LOVAZA) 1 g capsule, Take 2 g by mouth 2 (two) times a day, Disp: , Rfl:     sitaGLIPtin (JANUVIA) 50 mg tablet, Take 1 tablet by mouth daily, Disp: , Rfl:     torsemide (DEMADEX) 20 mg tablet, Take 1 tablet (20 mg total) by mouth daily Take 20 mg daily except for Monday Wednesday and Friday take 1-1/2 tablets equivalent to 30 mg, Disp: 235 tablet, Rfl: 3    Lab, Imaging and other studies: I have personally reviewed pertinent labs  Laboratory Results:  Results for orders placed or performed in visit on 06/10/21   Protein / creatinine ratio, urine   Result Value Ref Range    PROTEIN UA 15 9     EXT Creatinine Urine 46 3     EXTERNAL Ur Prot/Creat Ratio 0 34              Invalid input(s): ALBUMIN      Radiology review:   chest X-ray    Ultrasound      Portions of the record may have been created with voice recognition software  Occasional wrong word or "sound a like" substitutions may have occurred due to the inherent limitations of voice recognition software  Read the chart carefully and recognize, using context, where substitutions have occurred

## 2021-06-18 NOTE — PATIENT INSTRUCTIONS
1  Medication changes today:   Please increase hydralazine to 3 pills twice a day are 75 mg twice a day        2  Please take 1 week a blood pressure readings  4 weeks after making the above medication change     AS FOLLOWS  MORNING AND EVENING, SITTING AND STANDING as follows:  · TAKE THE MORNING READINGS BEFORE ANY MEDICATIONS AND WHEN YOU ARE RELAXED FOR SEVERAL MINUTES  · TAKE THE EVENING READINGS:  BETWEEN 7-10 P M ; PRIOR TO ANY MEDICATIONS; AT LEAST IN OUR  FROM DINNER; AND CERTAINLY AFTER RELAXING FOR A FEW MINUTES  · PLEASE INCLUDE HEART RATE WITH YOUR BLOOD PRESSURE READINGS  · When taking standing readings, keep your arm supported at heart level and not dangling  · Make sure you are sitting with your back supported and feet on the ground and do not cross your legs or feet  · Make sure you have not taken any coffee or caffeine products or exercised or smoke cigarettes at least 30 minutes before taking your blood pressure  Then please mail these readings into the office    3  Follow-up in 3-4 months   Please bring in 1 week a blood pressure readings morning evening, sitting and standing is outlined above   PLEASE BRING AN YOUR BLOOD PRESSURE MACHINE TO CORRELATE WITH THE OFFICE MACHINE AT THIS NEXT SCHEDULED VISIT   Please go for fasting lab work 1-2 weeks prior to your appointment:  Please do the lab work in 3 months      4  General instructions:   AVOID SALT BUT NOT ADDING AN READING LABELS TO MAKE SURE THERE IS LOW-SALT IN THE FOOD THAT YOU ARE EATING  o Goal is less than 2 g of sodium intake or less than 5 g of sodium chloride intake per day     Avoid nonsteroidal anti-inflammatory drugs such as Naprosyn, ibuprofen, Aleve, Advil, Celebrex, Meloxicam (Mobic) etc   You can use Tylenol as needed if you do not have any liver condition to be concerned about     Avoid medications such as Sudafed or decongestants and antihistamines that contained the D component which is the decongestant    You can take antihistamines without the decongestant or D component   Try to avoid medications such as pantoprazole or  Protonix/Nexium or Esomeprazole)/Prilosec or omeprazole/Prevacid or lansoprazole/AcipHex or Rabeprazole  If you are able to, use Pepcid as this is safer for your kidneys   Try to exercise at least 30 minutes 3 days a week to begin with with an ultimate goal of 5 days a week for at least 30 minutes     Try to lose 5-10 lb by your next visit     Please do not drink more than 2 glasses of alcohol/wine on a daily basis as this may contribute to your high blood pressure

## 2021-08-21 NOTE — LETTER
January 30, 2018     Ani Lund, [address] [place] [address],[address]  [address] [facility]    Patient: Ez Ramos   YOB: 1931   Date of Visit: 1/30/2018       Dear Dr Nikko Wright:    Thank you for referring Ez Ramos to me for evaluation  Below are my notes for this consultation  If you have questions, please do not hesitate to call me  I look forward to following your patient along with you  Sincerely,        Praful Carolina MD        CC: MD Praful Guan MD  1/30/2018  4:03 PM  Sign at close encounter  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  The mainstay of therapy remains good hypertensive control/good dyslipidemic control per your discretion/good diabetic control per your discretion off metformin  #2  Volume: She remains essentially euvolemic with nonpitting edema which is stable  #3  Hypertension: Slightly elevated systolic readings but some orthostatic changes  I would recommend switching from furosemide to torsemide 20 mg a day since it is a better and longer acting antihypertensive agent/diuretic  I would avoid overtreating her given her orthostasis  She will send in readings in a few weeks  Consideration for increasing amlodipine to 2 5 mg twice a day  I would hold off on an ACE inhibitor/angiotensin receptor 2 blocker given older age and insignificant proteinuria though it is an option going forward in the future  I'm always concerned about the possibility of AK I given her frail condition and age    #4  Electrolytes: Remains normal   #5  Mineral bone disorder:  Hypercalcemia has resolved with a level of 9 4  Workup is as follows:  -PTH intact level 23 4  -ACE level 65  -plasma free light chains normal  -urine protein electrophoresis normal  Further workup only if hypercalcemia recurs  #6  Anemia:  Hemoglobin main is quite good at 12 4  Currently on iron  #7  CAD status post aortic valve replacement doing well and follows with cardiology  #8  Diabetes mellitus per your discretion  Patient Instructions     1  Please increase amlodipine to 2-2 5 mg tablets equivalent to 5 mg once a day in the morning  When you run out of the 2 5 mg dose please call me and we will refill 5 mg tablets  Watch for swelling of your legs that worsens  2   Please wait 2 weeks and then take 1 week a blood pressure readings morning and evening  Take your morning readings before taking any medications in the morning  3   Please then bring in those blood pressure readings and your blood pressure machine to see 1 of my advanced practitioner's in about 3-4 weeks in the office  4   Follow-up in 4 months  Please go for lab work fasting prior to that appointment  We also bring in 1 week a blood pressure readings morning and evening at that appointment in 4 months  5   General recommendations:  -avoid salt  -try to stay active such as walking for 20-30 minutes a few days during the week  -do not take any medicines such as Motrin, Naprosyn, Aleve or Advil or ibuprofen on a regular basis, you can take Tylenol as needed   -avoid Sudafed or any decongestants as they can raise blood pressure as well   -please call if your leg swelling gets any worse  Chronic Hypertension   WHAT YOU NEED TO KNOW:   Hypertension is high blood pressure (BP)  Your BP is the force of your blood moving against the walls of your arteries  Normal BP is less than 120/80  Prehypertension is between 120/80 and 139/89  Hypertension is 140/90 or higher  Hypertension causes your BP to get so high that your heart has to work much harder than normal  This can damage your heart   Chronic hypertension is a long-term condition that you can control with a healthy lifestyle or medicines  A controlled blood pressure helps protect your organs, such as your heart, lungs, brain, and kidneys  DISCHARGE INSTRUCTIONS:   Call 911 for any of the following:   · You have discomfort in your chest that feels like squeezing, pressure, fullness, or pain  · You become confused or have difficulty speaking  · You suddenly feel lightheaded or have trouble breathing  · You have pain or discomfort in your back, neck, jaw, stomach, or arm  Seek care immediately if:   · You have a severe headache or vision loss  · You have weakness in an arm or leg  Contact your healthcare provider if:   · You feel faint, dizzy, confused, or drowsy  · You have been taking your BP medicine and your BP is still higher than your healthcare provider says it should be  · You have questions or concerns about your condition or care  Medicines: You may need any of the following:  · Medicine  may be used to help lower your BP  You may need more than one type of medicine  Take the medicine exactly as directed  · Diuretics  help decrease extra fluid that collects in your body  This will help lower your BP  You may urinate more often while you take this medicine  · Cholesterol medicine  helps lower your cholesterol level  A low cholesterol level helps prevent heart disease and makes it easier to control your blood pressure  · Take your medicine as directed  Contact your healthcare provider if you think your medicine is not helping or if you have side effects  Tell him or her if you are allergic to any medicine  Keep a list of the medicines, vitamins, and herbs you take  Include the amounts, and when and why you take them  Bring the list or the pill bottles to follow-up visits  Carry your medicine list with you in case of an emergency  Follow up with your healthcare provider as directed:   You will need to return to have your blood pressure checked and to have other lab tests done  Write down your questions so you remember to ask them during your visits  Manage chronic hypertension:  Talk with your healthcare provider about these and other ways to manage hypertension:  · Take your BP at home  Sit and rest for 5 minutes before you take your BP  Extend your arm and support it on a flat surface  Your arm should be at the same level as your heart  Follow the directions that came with your BP monitor  If possible, take at least 2 BP readings each time  Take your BP at least twice a day at the same times each day, such as morning and evening  Keep a record of your BP readings and bring it to your follow-up visits  Ask your healthcare provider what your blood pressure should be  · Limit sodium (salt) as directed  Too much sodium can affect your fluid balance  Check labels to find low-sodium or no-salt-added foods  Some low-sodium foods use potassium salts for flavor  Too much potassium can also cause health problems  Your healthcare provider will tell you how much sodium and potassium are safe for you to have in a day  He or she may recommend that you limit sodium to 2,300 mg a day  · Follow the meal plan recommended by your healthcare provider  A dietitian or your provider can give you more information on low-sodium plans or the DASH (Dietary Approaches to Stop Hypertension) eating plan  The DASH plan is low in sodium, unhealthy fats, and total fat  It is high in potassium, calcium, and fiber  · Exercise to maintain a healthy weight  Exercise at least 30 minutes per day, on most days of the week  This will help decrease your blood pressure  Ask about the best exercise plan for you  · Decrease stress  This may help lower your BP  Learn ways to relax, such as deep breathing or listening to music  · Limit alcohol  Women should limit alcohol to 1 drink a day  Men should limit alcohol to 2 drinks a day   A drink of alcohol is 12 ounces of beer, 5 ounces of wine, or 1½ ounces of liquor  · Do not smoke  Nicotine and other chemicals in cigarettes and cigars can increase your BP and also cause lung damage  Ask your healthcare provider for information if you currently smoke and need help to quit  E-cigarettes or smokeless tobacco still contain nicotine  Talk to your healthcare provider before you use these products  © 2017 [employer] Information is for End User's use only and may not be sold, redistributed or otherwise used for commercial purposes  All illustrations and images included in CareNotes® are the copyrighted property of [employer]  or [employer]  The above information is an  only  It is not intended as medical advice for individual conditions or treatments  Talk to your doctor, nurse or pharmacist before following any medical regimen to see if it is safe and effective for you  Subjective:   Overall the patient is doing well  Good appetite and energy  No fevers or chills  No cough or colds  No urinary symptoms including foamy urine  No GI symptoms including diarrhea  No chest pain, shortness of breath and no significant leg swelling  No headaches, dizziness or lightheadedness  Blood pressure medications:  -amlodipine 2 5 mg daily blood pressures at home:  -torsemide 20 mg daily  -metoprolol succinate/Toprol-XL:  100 mg 2 times a day  Blood pressure at home:  -a m :  152/79 with standing 139/83 heart rate in the 70  ROS:  See HPI, otherwise review of systems as completely reviewed with the patient are negative    I COMPLETELY REVIEWED THE PAST MEDICAL HISTORY/PAST SURGICAL HISTORY/SOCIAL HISTORY/FAMILY HISTORY/AND MEDICATIONS  AND UPDATED ALL    Objective:     Vitals:   Vitals:    01/30/18 1517   BP: 160/90   Pulse: 72     Blood pressure today:  -sitting: On left arm:  176/80 with a heart rate of 84 and regular  -standing:   On left arm:  172/80 with a heart rate of 84 and regular    Weight (last 2 days)     Date/Time   Weight    01/30/18 1517  86 2 (190)    01/30/18 1516  86 2 (190)            Body mass index is 31 62 kg/m²  Physical Exam: General:  No acute distress  Skin:  No acute rash  Eyes:  No scleral icterus  ENT:  Moist mucous membranes, normocephalic/atraumatic  Neck:  Supple, no jugular venous distention  Chest:  Clear to auscultation and percussion  CVS:  Regular rate and rhythm without a murmur rub or gallops appreciated  Abdomen:  Obese, soft and nontender with normal bowel sounds  Extremities:  No cyanosis, no clubbing, 1+ lower extremity edema is bilaterally 1/2 the way up the pretibial region  Neuro:  Grossly intact  Psych:  Alert and oriented      Medications:    Current Outpatient Prescriptions:     allopurinol (ZYLOPRIM) 100 mg tablet, Take 100 mg by mouth daily, Disp: , Rfl:     allopurinol (ZYLOPRIM) 300 mg tablet, Take 1 tablet by mouth daily, Disp: , Rfl:     aspirin (ASPIRIN LOW DOSE) 81 MG tablet, Take 1 tablet by mouth daily, Disp: , Rfl:     clopidogrel (PLAVIX) 75 mg tablet, Take 75 mg by mouth daily, Disp: , Rfl:     docusate sodium (COLACE) 100 mg capsule, Take 1 capsule by mouth daily, Disp: , Rfl:     ferrous sulfate 325 (65 FE) MG EC tablet, Take 1 capsule by mouth daily, Disp: , Rfl:     metoprolol succinate (TOPROL-XL) 100 mg 24 hr tablet, Take 1 tablet by mouth 2 (two) times a day, Disp: , Rfl:     sitaGLIPtin (JANUVIA) 50 mg tablet, Take 1 tablet by mouth daily, Disp: , Rfl:     amLODIPine (NORVASC) 2 5 mg tablet, Take 2 5 mg by mouth daily, Disp: , Rfl: 3    cholecalciferol (VITAMIN D3) 1,000 units tablet, Take 1,000 Units by mouth, Disp: , Rfl:     torsemide (DEMADEX) 20 mg tablet, Take 1 tablet (20 mg total) by mouth daily, Disp: 30 tablet, Rfl: 5    Lab, Imaging and other studies: I have personally reviewed pertinent labs    Laboratory Results:  Results for orders placed or performed in visit on 08/12/16   POCT urinalysis dipstick (Historical) Result Value Ref Range    Color, UA Yellow     Clarity, UA Transparent     Leukocytes, UA large     Nitrite, UA negative     Blood, UA trace     Bilirubin, UA negative     Urobilinogen, UA 0 2     Protein, UA negative     pH, UA 5 0     Specific Thomson, UA 1 010     Ketones, UA negative     Glucose negative     Lab results were performed at [facility] and I reviewed them as of labs 01/23/2018:  Pertinent positives:  -hemoglobin 12 4 otherwise normal CBC  -chemistry notable for creatinine 1 41 which is at baseline for her otherwise chemistry normal including electrolytes  -UPC:  0 16  -mineral bone disorder:  Calcium 9 4/magnesium 1 8/phosphorus 2 7/PTH intact level 23 4    Radiology review:   chest X-ray    Ultrasound      Portions of the record may have been created with voice recognition software   Occasional wrong word or "sound a like" substitutions may have occurred due to the inherent limitations of voice recognition software   Read the chart carefully and recognize, using context, where substitutions have occurred  <<-----Click here for Discharge Medication Review